# Patient Record
Sex: FEMALE | Race: WHITE | NOT HISPANIC OR LATINO | Employment: OTHER | URBAN - METROPOLITAN AREA
[De-identification: names, ages, dates, MRNs, and addresses within clinical notes are randomized per-mention and may not be internally consistent; named-entity substitution may affect disease eponyms.]

---

## 2022-12-16 ENCOUNTER — HOSPITAL ENCOUNTER (OUTPATIENT)
Dept: GENERAL RADIOLOGY | Facility: HOSPITAL | Age: 87
Discharge: HOME | End: 2022-12-16

## 2022-12-16 ENCOUNTER — PRE-ADMISSION TESTING (OUTPATIENT)
Dept: PREADMISSION TESTING | Facility: HOSPITAL | Age: 87
End: 2022-12-16

## 2022-12-16 VITALS
RESPIRATION RATE: 18 BRPM | WEIGHT: 119 LBS | BODY MASS INDEX: 21.09 KG/M2 | DIASTOLIC BLOOD PRESSURE: 73 MMHG | HEIGHT: 63 IN | SYSTOLIC BLOOD PRESSURE: 147 MMHG | HEART RATE: 82 BPM | TEMPERATURE: 98 F | OXYGEN SATURATION: 98 %

## 2022-12-16 LAB
ALBUMIN SERPL-MCNC: 4.2 G/DL (ref 3.5–5.2)
ALBUMIN/GLOB SERPL: 2.6 G/DL
ALP SERPL-CCNC: 58 U/L (ref 39–117)
ALT SERPL W P-5'-P-CCNC: 11 U/L (ref 1–33)
ANION GAP SERPL CALCULATED.3IONS-SCNC: 5.4 MMOL/L (ref 5–15)
AST SERPL-CCNC: 14 U/L (ref 1–32)
BILIRUB SERPL-MCNC: 0.3 MG/DL (ref 0–1.2)
BUN SERPL-MCNC: 24 MG/DL (ref 8–23)
BUN/CREAT SERPL: 37.5 (ref 7–25)
CALCIUM SPEC-SCNC: 8.9 MG/DL (ref 8.2–9.6)
CHLORIDE SERPL-SCNC: 105 MMOL/L (ref 98–107)
CO2 SERPL-SCNC: 26.6 MMOL/L (ref 22–29)
CREAT SERPL-MCNC: 0.64 MG/DL (ref 0.57–1)
DEPRECATED RDW RBC AUTO: 39.9 FL (ref 37–54)
EGFRCR SERPLBLD CKD-EPI 2021: 84.1 ML/MIN/1.73
ERYTHROCYTE [DISTWIDTH] IN BLOOD BY AUTOMATED COUNT: 12 % (ref 12.3–15.4)
GLOBULIN UR ELPH-MCNC: 1.6 GM/DL
GLUCOSE SERPL-MCNC: 94 MG/DL (ref 65–99)
HBA1C MFR BLD: 5.5 % (ref 4.8–5.6)
HCT VFR BLD AUTO: 39.9 % (ref 34–46.6)
HGB BLD-MCNC: 13.2 G/DL (ref 12–15.9)
MCH RBC QN AUTO: 29.9 PG (ref 26.6–33)
MCHC RBC AUTO-ENTMCNC: 33.1 G/DL (ref 31.5–35.7)
MCV RBC AUTO: 90.5 FL (ref 79–97)
PLATELET # BLD AUTO: 236 10*3/MM3 (ref 140–450)
PMV BLD AUTO: 10.3 FL (ref 6–12)
POTASSIUM SERPL-SCNC: 4 MMOL/L (ref 3.5–5.2)
PROT SERPL-MCNC: 5.8 G/DL (ref 6–8.5)
QT INTERVAL: 424 MS
RBC # BLD AUTO: 4.41 10*6/MM3 (ref 3.77–5.28)
SODIUM SERPL-SCNC: 137 MMOL/L (ref 136–145)
WBC NRBC COR # BLD: 7.55 10*3/MM3 (ref 3.4–10.8)

## 2022-12-16 PROCEDURE — 73560 X-RAY EXAM OF KNEE 1 OR 2: CPT

## 2022-12-16 PROCEDURE — 36415 COLL VENOUS BLD VENIPUNCTURE: CPT

## 2022-12-16 PROCEDURE — 93005 ELECTROCARDIOGRAM TRACING: CPT

## 2022-12-16 PROCEDURE — 71046 X-RAY EXAM CHEST 2 VIEWS: CPT

## 2022-12-16 PROCEDURE — 85027 COMPLETE CBC AUTOMATED: CPT

## 2022-12-16 PROCEDURE — 83036 HEMOGLOBIN GLYCOSYLATED A1C: CPT

## 2022-12-16 PROCEDURE — 80053 COMPREHEN METABOLIC PANEL: CPT

## 2022-12-16 PROCEDURE — 93010 ELECTROCARDIOGRAM REPORT: CPT | Performed by: INTERNAL MEDICINE

## 2022-12-16 NOTE — DISCHARGE INSTRUCTIONS

## 2022-12-29 ENCOUNTER — APPOINTMENT (OUTPATIENT)
Dept: GENERAL RADIOLOGY | Facility: HOSPITAL | Age: 87
End: 2022-12-29
Payer: COMMERCIAL

## 2022-12-29 ENCOUNTER — ANESTHESIA EVENT (OUTPATIENT)
Dept: PERIOP | Facility: HOSPITAL | Age: 87
End: 2022-12-29
Payer: COMMERCIAL

## 2022-12-29 ENCOUNTER — ANESTHESIA (OUTPATIENT)
Dept: PERIOP | Facility: HOSPITAL | Age: 87
End: 2022-12-29
Payer: COMMERCIAL

## 2022-12-29 ENCOUNTER — HOSPITAL ENCOUNTER (OUTPATIENT)
Facility: HOSPITAL | Age: 87
Discharge: HOME | End: 2022-12-30
Attending: ORTHOPAEDIC SURGERY | Admitting: ORTHOPAEDIC SURGERY
Payer: COMMERCIAL

## 2022-12-29 DIAGNOSIS — M17.12 PRIMARY OSTEOARTHRITIS OF LEFT KNEE: Primary | ICD-10-CM

## 2022-12-29 PROCEDURE — C1713 ANCHOR/SCREW BN/BN,TIS/BN: HCPCS | Performed by: ORTHOPAEDIC SURGERY

## 2022-12-29 PROCEDURE — G0378 HOSPITAL OBSERVATION PER HR: HCPCS

## 2022-12-29 PROCEDURE — 25010000002 ROPIVACAINE PER 1 MG: Performed by: ORTHOPAEDIC SURGERY

## 2022-12-29 PROCEDURE — 73560 X-RAY EXAM OF KNEE 1 OR 2: CPT

## 2022-12-29 PROCEDURE — 25010000002 CEFAZOLIN PER 500 MG: Performed by: NURSE ANESTHETIST, CERTIFIED REGISTERED

## 2022-12-29 PROCEDURE — 25010000002 DEXAMETHASONE SODIUM PHOSPHATE 20 MG/5ML SOLUTION: Performed by: ANESTHESIOLOGY

## 2022-12-29 PROCEDURE — 97162 PT EVAL MOD COMPLEX 30 MIN: CPT

## 2022-12-29 PROCEDURE — 25010000002 ONDANSETRON PER 1 MG: Performed by: NURSE ANESTHETIST, CERTIFIED REGISTERED

## 2022-12-29 PROCEDURE — 25010000002 KETOROLAC TROMETHAMINE PER 15 MG: Performed by: ORTHOPAEDIC SURGERY

## 2022-12-29 PROCEDURE — 25010000002 MORPHINE PER 10 MG: Performed by: ORTHOPAEDIC SURGERY

## 2022-12-29 PROCEDURE — 25010000002 NEOSTIGMINE 5 MG/10ML SOLUTION: Performed by: NURSE ANESTHETIST, CERTIFIED REGISTERED

## 2022-12-29 PROCEDURE — 97110 THERAPEUTIC EXERCISES: CPT

## 2022-12-29 PROCEDURE — 25010000002 PROPOFOL 10 MG/ML EMULSION: Performed by: NURSE ANESTHETIST, CERTIFIED REGISTERED

## 2022-12-29 PROCEDURE — 25010000002 EPINEPHRINE 1 MG/ML SOLUTION 30 ML VIAL: Performed by: ORTHOPAEDIC SURGERY

## 2022-12-29 PROCEDURE — C1776 JOINT DEVICE (IMPLANTABLE): HCPCS | Performed by: ORTHOPAEDIC SURGERY

## 2022-12-29 PROCEDURE — 25010000002 CEFAZOLIN IN DEXTROSE 2-4 GM/100ML-% SOLUTION: Performed by: ORTHOPAEDIC SURGERY

## 2022-12-29 PROCEDURE — 76942 ECHO GUIDE FOR BIOPSY: CPT | Performed by: ORTHOPAEDIC SURGERY

## 2022-12-29 PROCEDURE — 25010000002 ROPIVACAINE PER 1 MG: Performed by: ANESTHESIOLOGY

## 2022-12-29 DEVICE — IMPLANTABLE DEVICE
Type: IMPLANTABLE DEVICE | Site: KNEE | Status: FUNCTIONAL
Brand: VANGUARD® KNEE SYSTEM

## 2022-12-29 DEVICE — CAP TOTL KN CMT PRIMARY: Type: IMPLANTABLE DEVICE | Status: FUNCTIONAL

## 2022-12-29 DEVICE — IMPLANTABLE DEVICE
Type: IMPLANTABLE DEVICE | Site: KNEE | Status: FUNCTIONAL
Brand: BIOMET® KNEE SYSTEM

## 2022-12-29 DEVICE — IMPLANTABLE DEVICE
Type: IMPLANTABLE DEVICE | Site: KNEE | Status: FUNCTIONAL
Brand: BIOMET KNEE SYSTEM

## 2022-12-29 DEVICE — CMT BONE R 1X40: Type: IMPLANTABLE DEVICE | Site: KNEE | Status: FUNCTIONAL

## 2022-12-29 RX ORDER — GLYCOPYRROLATE 0.2 MG/ML
INJECTION INTRAMUSCULAR; INTRAVENOUS AS NEEDED
Status: DISCONTINUED | OUTPATIENT
Start: 2022-12-29 | End: 2022-12-29 | Stop reason: SURG

## 2022-12-29 RX ORDER — NALOXONE HCL 0.4 MG/ML
0.2 VIAL (ML) INJECTION AS NEEDED
Status: DISCONTINUED | OUTPATIENT
Start: 2022-12-29 | End: 2022-12-29 | Stop reason: HOSPADM

## 2022-12-29 RX ORDER — DOCUSATE SODIUM 250 MG
250 CAPSULE ORAL 2 TIMES DAILY PRN
Qty: 30 CAPSULE | Refills: 1 | Status: SHIPPED | OUTPATIENT
Start: 2022-12-29

## 2022-12-29 RX ORDER — DOCUSATE SODIUM 100 MG/1
100 CAPSULE, LIQUID FILLED ORAL 2 TIMES DAILY PRN
Status: DISCONTINUED | OUTPATIENT
Start: 2022-12-29 | End: 2022-12-30 | Stop reason: HOSPADM

## 2022-12-29 RX ORDER — KETOROLAC TROMETHAMINE 15 MG/ML
15 INJECTION, SOLUTION INTRAMUSCULAR; INTRAVENOUS EVERY 6 HOURS PRN
Status: DISCONTINUED | OUTPATIENT
Start: 2022-12-29 | End: 2022-12-30 | Stop reason: HOSPADM

## 2022-12-29 RX ORDER — DIAZEPAM 5 MG/1
5 TABLET ORAL 2 TIMES DAILY PRN
Status: DISCONTINUED | OUTPATIENT
Start: 2022-12-29 | End: 2022-12-30 | Stop reason: HOSPADM

## 2022-12-29 RX ORDER — SODIUM CHLORIDE, SODIUM LACTATE, POTASSIUM CHLORIDE, CALCIUM CHLORIDE 600; 310; 30; 20 MG/100ML; MG/100ML; MG/100ML; MG/100ML
9 INJECTION, SOLUTION INTRAVENOUS CONTINUOUS
Status: DISCONTINUED | OUTPATIENT
Start: 2022-12-29 | End: 2022-12-30 | Stop reason: HOSPADM

## 2022-12-29 RX ORDER — DIPHENHYDRAMINE HCL 25 MG
25 CAPSULE ORAL
Status: DISCONTINUED | OUTPATIENT
Start: 2022-12-29 | End: 2022-12-29 | Stop reason: HOSPADM

## 2022-12-29 RX ORDER — ASPIRIN 325 MG
325 TABLET, DELAYED RELEASE (ENTERIC COATED) ORAL DAILY
Status: DISCONTINUED | OUTPATIENT
Start: 2022-12-30 | End: 2022-12-30 | Stop reason: HOSPADM

## 2022-12-29 RX ORDER — MORPHINE SULFATE 4 MG/ML
4 INJECTION, SOLUTION INTRAMUSCULAR; INTRAVENOUS
Status: DISCONTINUED | OUTPATIENT
Start: 2022-12-29 | End: 2022-12-30 | Stop reason: HOSPADM

## 2022-12-29 RX ORDER — SODIUM CHLORIDE 9 MG/ML
40 INJECTION, SOLUTION INTRAVENOUS AS NEEDED
Status: DISCONTINUED | OUTPATIENT
Start: 2022-12-29 | End: 2022-12-30 | Stop reason: HOSPADM

## 2022-12-29 RX ORDER — NEOSTIGMINE METHYLSULFATE 0.5 MG/ML
INJECTION, SOLUTION INTRAVENOUS AS NEEDED
Status: DISCONTINUED | OUTPATIENT
Start: 2022-12-29 | End: 2022-12-29 | Stop reason: SURG

## 2022-12-29 RX ORDER — IPRATROPIUM BROMIDE AND ALBUTEROL SULFATE 2.5; .5 MG/3ML; MG/3ML
3 SOLUTION RESPIRATORY (INHALATION) ONCE AS NEEDED
Status: DISCONTINUED | OUTPATIENT
Start: 2022-12-29 | End: 2022-12-29 | Stop reason: HOSPADM

## 2022-12-29 RX ORDER — DIPHENHYDRAMINE HYDROCHLORIDE 50 MG/ML
12.5 INJECTION INTRAMUSCULAR; INTRAVENOUS EVERY 6 HOURS PRN
Status: DISCONTINUED | OUTPATIENT
Start: 2022-12-29 | End: 2022-12-30 | Stop reason: HOSPADM

## 2022-12-29 RX ORDER — PROMETHAZINE HYDROCHLORIDE 25 MG/1
25 TABLET ORAL ONCE AS NEEDED
Status: DISCONTINUED | OUTPATIENT
Start: 2022-12-29 | End: 2022-12-29 | Stop reason: HOSPADM

## 2022-12-29 RX ORDER — ONDANSETRON 2 MG/ML
4 INJECTION INTRAMUSCULAR; INTRAVENOUS ONCE AS NEEDED
Status: DISCONTINUED | OUTPATIENT
Start: 2022-12-29 | End: 2022-12-29 | Stop reason: HOSPADM

## 2022-12-29 RX ORDER — ONDANSETRON 4 MG/1
4 TABLET, FILM COATED ORAL EVERY 6 HOURS PRN
Status: DISCONTINUED | OUTPATIENT
Start: 2022-12-29 | End: 2022-12-30 | Stop reason: HOSPADM

## 2022-12-29 RX ORDER — FENTANYL CITRATE 50 UG/ML
25 INJECTION, SOLUTION INTRAMUSCULAR; INTRAVENOUS
Status: DISCONTINUED | OUTPATIENT
Start: 2022-12-29 | End: 2022-12-29 | Stop reason: HOSPADM

## 2022-12-29 RX ORDER — CEFAZOLIN SODIUM 500 MG/2.2ML
INJECTION, POWDER, FOR SOLUTION INTRAMUSCULAR; INTRAVENOUS AS NEEDED
Status: DISCONTINUED | OUTPATIENT
Start: 2022-12-29 | End: 2022-12-29 | Stop reason: SURG

## 2022-12-29 RX ORDER — PROMETHAZINE HYDROCHLORIDE 12.5 MG/1
12.5 TABLET ORAL EVERY 6 HOURS PRN
Status: DISCONTINUED | OUTPATIENT
Start: 2022-12-29 | End: 2022-12-30 | Stop reason: HOSPADM

## 2022-12-29 RX ORDER — SODIUM CHLORIDE 0.9 % (FLUSH) 0.9 %
10 SYRINGE (ML) INJECTION EVERY 12 HOURS SCHEDULED
Status: DISCONTINUED | OUTPATIENT
Start: 2022-12-29 | End: 2022-12-30 | Stop reason: HOSPADM

## 2022-12-29 RX ORDER — CHOLECALCIFEROL (VITAMIN D3) 125 MCG
5 CAPSULE ORAL NIGHTLY PRN
Status: DISCONTINUED | OUTPATIENT
Start: 2022-12-29 | End: 2022-12-30 | Stop reason: HOSPADM

## 2022-12-29 RX ORDER — ROCURONIUM BROMIDE 10 MG/ML
INJECTION, SOLUTION INTRAVENOUS AS NEEDED
Status: DISCONTINUED | OUTPATIENT
Start: 2022-12-29 | End: 2022-12-29 | Stop reason: SURG

## 2022-12-29 RX ORDER — CLINDAMYCIN PHOSPHATE 900 MG/50ML
900 INJECTION INTRAVENOUS EVERY 8 HOURS
Status: COMPLETED | OUTPATIENT
Start: 2022-12-29 | End: 2022-12-30

## 2022-12-29 RX ORDER — FAMOTIDINE 10 MG/ML
20 INJECTION, SOLUTION INTRAVENOUS ONCE
Status: COMPLETED | OUTPATIENT
Start: 2022-12-29 | End: 2022-12-29

## 2022-12-29 RX ORDER — PHENYLEPHRINE HCL IN 0.9% NACL 1 MG/10 ML
SYRINGE (ML) INTRAVENOUS AS NEEDED
Status: DISCONTINUED | OUTPATIENT
Start: 2022-12-29 | End: 2022-12-29 | Stop reason: SURG

## 2022-12-29 RX ORDER — SODIUM CHLORIDE 0.9 % (FLUSH) 0.9 %
1-10 SYRINGE (ML) INJECTION AS NEEDED
Status: DISCONTINUED | OUTPATIENT
Start: 2022-12-29 | End: 2022-12-30 | Stop reason: HOSPADM

## 2022-12-29 RX ORDER — OXYCODONE AND ACETAMINOPHEN 7.5; 325 MG/1; MG/1
1 TABLET ORAL EVERY 4 HOURS PRN
Status: DISCONTINUED | OUTPATIENT
Start: 2022-12-29 | End: 2022-12-29 | Stop reason: HOSPADM

## 2022-12-29 RX ORDER — MAGNESIUM HYDROXIDE 1200 MG/15ML
LIQUID ORAL AS NEEDED
Status: DISCONTINUED | OUTPATIENT
Start: 2022-12-29 | End: 2022-12-29 | Stop reason: HOSPADM

## 2022-12-29 RX ORDER — TRANEXAMIC ACID 100 MG/ML
INJECTION, SOLUTION INTRAVENOUS AS NEEDED
Status: DISCONTINUED | OUTPATIENT
Start: 2022-12-29 | End: 2022-12-29 | Stop reason: SURG

## 2022-12-29 RX ORDER — PROPOFOL 10 MG/ML
VIAL (ML) INTRAVENOUS AS NEEDED
Status: DISCONTINUED | OUTPATIENT
Start: 2022-12-29 | End: 2022-12-29 | Stop reason: SURG

## 2022-12-29 RX ORDER — OXYCODONE HYDROCHLORIDE AND ACETAMINOPHEN 5; 325 MG/1; MG/1
1 TABLET ORAL EVERY 4 HOURS PRN
Status: DISCONTINUED | OUTPATIENT
Start: 2022-12-29 | End: 2022-12-30 | Stop reason: HOSPADM

## 2022-12-29 RX ORDER — DEXAMETHASONE SODIUM PHOSPHATE 4 MG/ML
INJECTION, SOLUTION INTRA-ARTICULAR; INTRALESIONAL; INTRAMUSCULAR; INTRAVENOUS; SOFT TISSUE
Status: COMPLETED | OUTPATIENT
Start: 2022-12-29 | End: 2022-12-29

## 2022-12-29 RX ORDER — ACETAMINOPHEN 500 MG
1000 TABLET ORAL ONCE
Status: COMPLETED | OUTPATIENT
Start: 2022-12-29 | End: 2022-12-29

## 2022-12-29 RX ORDER — ONDANSETRON 2 MG/ML
4 INJECTION INTRAMUSCULAR; INTRAVENOUS EVERY 6 HOURS PRN
Status: DISCONTINUED | OUTPATIENT
Start: 2022-12-29 | End: 2022-12-30 | Stop reason: HOSPADM

## 2022-12-29 RX ORDER — SODIUM CHLORIDE 0.9 % (FLUSH) 0.9 %
3-10 SYRINGE (ML) INJECTION AS NEEDED
Status: DISCONTINUED | OUTPATIENT
Start: 2022-12-29 | End: 2022-12-29 | Stop reason: HOSPADM

## 2022-12-29 RX ORDER — ONDANSETRON 2 MG/ML
INJECTION INTRAMUSCULAR; INTRAVENOUS AS NEEDED
Status: DISCONTINUED | OUTPATIENT
Start: 2022-12-29 | End: 2022-12-29 | Stop reason: SURG

## 2022-12-29 RX ORDER — LABETALOL HYDROCHLORIDE 5 MG/ML
5 INJECTION, SOLUTION INTRAVENOUS
Status: DISCONTINUED | OUTPATIENT
Start: 2022-12-29 | End: 2022-12-29 | Stop reason: HOSPADM

## 2022-12-29 RX ORDER — LIDOCAINE HYDROCHLORIDE 10 MG/ML
0.5 INJECTION, SOLUTION EPIDURAL; INFILTRATION; INTRACAUDAL; PERINEURAL ONCE AS NEEDED
Status: COMPLETED | OUTPATIENT
Start: 2022-12-29 | End: 2022-12-29

## 2022-12-29 RX ORDER — DIPHENHYDRAMINE HYDROCHLORIDE 50 MG/ML
12.5 INJECTION INTRAMUSCULAR; INTRAVENOUS
Status: DISCONTINUED | OUTPATIENT
Start: 2022-12-29 | End: 2022-12-29 | Stop reason: HOSPADM

## 2022-12-29 RX ORDER — SODIUM CHLORIDE 0.9 % (FLUSH) 0.9 %
3 SYRINGE (ML) INJECTION EVERY 12 HOURS SCHEDULED
Status: DISCONTINUED | OUTPATIENT
Start: 2022-12-29 | End: 2022-12-29 | Stop reason: HOSPADM

## 2022-12-29 RX ORDER — FLUMAZENIL 0.1 MG/ML
0.2 INJECTION INTRAVENOUS AS NEEDED
Status: DISCONTINUED | OUTPATIENT
Start: 2022-12-29 | End: 2022-12-29 | Stop reason: HOSPADM

## 2022-12-29 RX ORDER — DIPHENHYDRAMINE HCL 25 MG
25 CAPSULE ORAL EVERY 6 HOURS PRN
Status: DISCONTINUED | OUTPATIENT
Start: 2022-12-29 | End: 2022-12-30 | Stop reason: HOSPADM

## 2022-12-29 RX ORDER — ACETAMINOPHEN 325 MG/1
325 TABLET ORAL EVERY 4 HOURS PRN
Status: DISCONTINUED | OUTPATIENT
Start: 2022-12-29 | End: 2022-12-30 | Stop reason: HOSPADM

## 2022-12-29 RX ORDER — PROMETHAZINE HYDROCHLORIDE 25 MG/1
25 SUPPOSITORY RECTAL ONCE AS NEEDED
Status: DISCONTINUED | OUTPATIENT
Start: 2022-12-29 | End: 2022-12-29 | Stop reason: HOSPADM

## 2022-12-29 RX ORDER — ROPIVACAINE HYDROCHLORIDE 5 MG/ML
INJECTION, SOLUTION EPIDURAL; INFILTRATION; PERINEURAL
Status: COMPLETED | OUTPATIENT
Start: 2022-12-29 | End: 2022-12-29

## 2022-12-29 RX ORDER — SODIUM CHLORIDE 450 MG/100ML
100 INJECTION, SOLUTION INTRAVENOUS CONTINUOUS
Status: DISCONTINUED | OUTPATIENT
Start: 2022-12-29 | End: 2022-12-30 | Stop reason: HOSPADM

## 2022-12-29 RX ORDER — NALOXONE HCL 0.4 MG/ML
0.4 VIAL (ML) INJECTION
Status: DISCONTINUED | OUTPATIENT
Start: 2022-12-29 | End: 2022-12-30 | Stop reason: HOSPADM

## 2022-12-29 RX ORDER — OXYCODONE HYDROCHLORIDE AND ACETAMINOPHEN 5; 325 MG/1; MG/1
2 TABLET ORAL EVERY 4 HOURS PRN
Status: DISCONTINUED | OUTPATIENT
Start: 2022-12-29 | End: 2022-12-30 | Stop reason: HOSPADM

## 2022-12-29 RX ORDER — CELECOXIB 200 MG/1
200 CAPSULE ORAL ONCE
Status: COMPLETED | OUTPATIENT
Start: 2022-12-29 | End: 2022-12-29

## 2022-12-29 RX ORDER — OXYCODONE HYDROCHLORIDE AND ACETAMINOPHEN 5; 325 MG/1; MG/1
1-2 TABLET ORAL EVERY 4 HOURS PRN
Qty: 42 TABLET | Refills: 0 | Status: SHIPPED | OUTPATIENT
Start: 2022-12-29

## 2022-12-29 RX ORDER — ASPIRIN 325 MG
325 TABLET, DELAYED RELEASE (ENTERIC COATED) ORAL DAILY
Qty: 42 TABLET | Refills: 0 | Status: SHIPPED | OUTPATIENT
Start: 2022-12-30

## 2022-12-29 RX ORDER — HYDRALAZINE HYDROCHLORIDE 20 MG/ML
5 INJECTION INTRAMUSCULAR; INTRAVENOUS
Status: DISCONTINUED | OUTPATIENT
Start: 2022-12-29 | End: 2022-12-29 | Stop reason: HOSPADM

## 2022-12-29 RX ORDER — HYDROCODONE BITARTRATE AND ACETAMINOPHEN 7.5; 325 MG/1; MG/1
1 TABLET ORAL ONCE AS NEEDED
Status: COMPLETED | OUTPATIENT
Start: 2022-12-29 | End: 2022-12-29

## 2022-12-29 RX ORDER — SODIUM CHLORIDE, SODIUM LACTATE, POTASSIUM CHLORIDE, CALCIUM CHLORIDE 600; 310; 30; 20 MG/100ML; MG/100ML; MG/100ML; MG/100ML
100 INJECTION, SOLUTION INTRAVENOUS CONTINUOUS
Status: DISCONTINUED | OUTPATIENT
Start: 2022-12-29 | End: 2022-12-30 | Stop reason: HOSPADM

## 2022-12-29 RX ORDER — EPHEDRINE SULFATE 50 MG/ML
5 INJECTION, SOLUTION INTRAVENOUS ONCE AS NEEDED
Status: DISCONTINUED | OUTPATIENT
Start: 2022-12-29 | End: 2022-12-29 | Stop reason: HOSPADM

## 2022-12-29 RX ORDER — LIDOCAINE HYDROCHLORIDE 20 MG/ML
INJECTION, SOLUTION INTRAVENOUS AS NEEDED
Status: DISCONTINUED | OUTPATIENT
Start: 2022-12-29 | End: 2022-12-29 | Stop reason: SURG

## 2022-12-29 RX ORDER — FERROUS SULFATE 325(65) MG
325 TABLET ORAL
Status: DISCONTINUED | OUTPATIENT
Start: 2022-12-29 | End: 2022-12-30 | Stop reason: HOSPADM

## 2022-12-29 RX ORDER — CEFAZOLIN SODIUM 2 G/100ML
2 INJECTION, SOLUTION INTRAVENOUS ONCE
Status: COMPLETED | OUTPATIENT
Start: 2022-12-29 | End: 2022-12-29

## 2022-12-29 RX ADMIN — DEXAMETHASONE SODIUM PHOSPHATE 4 MG: 4 INJECTION, SOLUTION INTRAMUSCULAR; INTRAVENOUS at 06:42

## 2022-12-29 RX ADMIN — SODIUM CHLORIDE, POTASSIUM CHLORIDE, SODIUM LACTATE AND CALCIUM CHLORIDE 9 ML/HR: 600; 310; 30; 20 INJECTION, SOLUTION INTRAVENOUS at 06:05

## 2022-12-29 RX ADMIN — Medication 10 ML: at 22:09

## 2022-12-29 RX ADMIN — Medication 100 MCG: at 08:46

## 2022-12-29 RX ADMIN — NEOSTIGMINE METHYLSULFATE 2 MG: 0.5 INJECTION INTRAVENOUS at 08:58

## 2022-12-29 RX ADMIN — TRANEXAMIC ACID 1000 MG: 1 INJECTION, SOLUTION INTRAVENOUS at 07:30

## 2022-12-29 RX ADMIN — DEXAMETHASONE SODIUM PHOSPHATE 6 MG: 4 INJECTION, SOLUTION INTRAMUSCULAR; INTRAVENOUS at 07:22

## 2022-12-29 RX ADMIN — CLINDAMYCIN PHOSPHATE 900 MG: 900 INJECTION, SOLUTION INTRAVENOUS at 16:55

## 2022-12-29 RX ADMIN — CEFAZOLIN 2 G: 1 INJECTION, POWDER, FOR SOLUTION INTRAMUSCULAR; INTRAVENOUS at 08:42

## 2022-12-29 RX ADMIN — Medication 100 MCG: at 07:41

## 2022-12-29 RX ADMIN — SODIUM CHLORIDE 100 ML/HR: 4.5 INJECTION, SOLUTION INTRAVENOUS at 13:59

## 2022-12-29 RX ADMIN — LIDOCAINE HYDROCHLORIDE 60 MG: 20 INJECTION, SOLUTION INTRAVENOUS at 07:22

## 2022-12-29 RX ADMIN — ROCURONIUM BROMIDE 35 MG: 10 INJECTION, SOLUTION INTRAVENOUS at 07:22

## 2022-12-29 RX ADMIN — HYDROCODONE BITARTRATE AND ACETAMINOPHEN 1 TABLET: 7.5; 325 TABLET ORAL at 09:53

## 2022-12-29 RX ADMIN — FERROUS SULFATE TAB 325 MG (65 MG ELEMENTAL FE) 325 MG: 325 (65 FE) TAB at 13:59

## 2022-12-29 RX ADMIN — PROPOFOL 100 MG: 10 INJECTION, EMULSION INTRAVENOUS at 07:22

## 2022-12-29 RX ADMIN — LIDOCAINE HYDROCHLORIDE 0.5 ML: 10 INJECTION, SOLUTION EPIDURAL; INFILTRATION; INTRACAUDAL; PERINEURAL at 06:00

## 2022-12-29 RX ADMIN — ONDANSETRON 4 MG: 2 INJECTION INTRAMUSCULAR; INTRAVENOUS at 08:58

## 2022-12-29 RX ADMIN — FAMOTIDINE 20 MG: 10 INJECTION INTRAVENOUS at 06:08

## 2022-12-29 RX ADMIN — PROPOFOL 100 MCG/KG/MIN: 10 INJECTION, EMULSION INTRAVENOUS at 07:27

## 2022-12-29 RX ADMIN — OXYCODONE AND ACETAMINOPHEN 1 TABLET: 5; 325 TABLET ORAL at 22:09

## 2022-12-29 RX ADMIN — CELECOXIB 200 MG: 200 CAPSULE ORAL at 05:49

## 2022-12-29 RX ADMIN — ROPIVACAINE HYDROCHLORIDE 20 ML: 5 INJECTION, SOLUTION EPIDURAL; INFILTRATION; PERINEURAL at 06:42

## 2022-12-29 RX ADMIN — ACETAMINOPHEN 1000 MG: 500 TABLET, FILM COATED ORAL at 05:49

## 2022-12-29 RX ADMIN — Medication 5 MG: at 22:09

## 2022-12-29 RX ADMIN — CEFAZOLIN SODIUM 2 G: 2 INJECTION, SOLUTION INTRAVENOUS at 06:48

## 2022-12-29 RX ADMIN — SODIUM CHLORIDE, POTASSIUM CHLORIDE, SODIUM LACTATE AND CALCIUM CHLORIDE 100 ML/HR: 600; 310; 30; 20 INJECTION, SOLUTION INTRAVENOUS at 10:48

## 2022-12-29 RX ADMIN — GLYCOPYRROLATE 0.4 MCG: 1 INJECTION INTRAMUSCULAR; INTRAVENOUS at 08:58

## 2022-12-29 RX ADMIN — Medication 10 ML: at 13:59

## 2022-12-29 NOTE — ANESTHESIA PROCEDURE NOTES
Adductor Canal Block      Patient reassessed immediately prior to procedure    Patient location during procedure: pre-op  Start time: 12/29/2022 6:42 AM  Stop time: 12/29/2022 6:44 AM  Reason for block: at surgeon's request and post-op pain management  Performed by  Anesthesiologist: Apurva Davidson MD  Preanesthetic Checklist  Completed: patient identified, IV checked, site marked, risks and benefits discussed, surgical consent, monitors and equipment checked, pre-op evaluation and timeout performed  Prep:  Pt Position: sitting  Sterile barriers:cap, gloves and mask  Prep: ChloraPrep  Patient monitoring: blood pressure monitoring, continuous pulse oximetry and EKG  Procedure    Sedation: no  Performed under: local infiltration  Guidance:ultrasound guided    ULTRASOUND INTERPRETATION.  Using ultrasound guidance a 21 G gauge needle was placed in close proximity to the nerve, at which point, under ultrasound guidance anesthetic was injected in the area of the nerve and spread of the anesthesia was seen on ultrasound in close proximity thereto.  There were no abnormalities seen on ultrasound; a digital image was taken; and the patient tolerated the procedure with no complications. Images:still images obtained, printed/placed on chart    Laterality:left  Block Type:adductor canal block  Injection Technique:single-shot  Needle Type:short-bevel and echogenic  Needle Gauge:21 G      Medications Used: dexamethasone (DECADRON) injection - Injection   4 mg - 12/29/2022 6:42:00 AM  ropivacaine (NAROPIN) 0.5 % injection - Injection   20 mL - 12/29/2022 6:42:00 AM      Medications  Comment:Ultrasound Interpretation:  Using ultrasound guidance the needle was placed in close proximity to the target nerve and anesthetic was injected in the area of the target nerve and/or bundles, and spread of the anesthetic was seen on ultrasound in close proximity thereto.  There were no abnormalities seen on ultrasound; a digital image was  taken; and the patient tolerated the procedure with no complications.    Block placed for postoperative pain control per surgeon request.       Post Assessment  Injection Assessment: negative aspiration for heme, no paresthesia on injection and incremental injection  Patient Tolerance:comfortable throughout block  Complications:no

## 2022-12-29 NOTE — DISCHARGE SUMMARY
Orthopaedic Surgery Discharge Summary    Patient Name:  Aydee May  YOB: 1932  Age: 89 y.o.  Medical Records Number:  [id number]    Date of Admission:  12/29/2022  Date of Discharge:  12/30/22    Primary Discharge Diagnosis:  Primary osteoarthritis of left knee [M17.12]    Secondary Discharge Diagnosis:    Problems Addressed this Visit        Musculoskeletal and Injuries    * (Principal) Primary osteoarthritis of left knee - Primary    Relevant Medications    oxyCODONE-acetaminophen (PERCOCET) 5-325 MG per tablet   Diagnoses       Codes Comments    Primary osteoarthritis of left knee    -  Primary ICD-10-CM: M17.12  ICD-9-CM: 715.16           Procedures Performed:  Left Total Knee Arthroplasty      Hospital Course:    Aydee May is a 89 y.o.  female who underwent successful left tka on 12/29/2022.  Aydee May was started on Aspirin 325 mg po daily post-operatively for DVT prophylaxis.  On post-op day 1 the patients dressing was changed and their incision was clean, with no signs of infection and their calf was soft, with no signs of DVT.  The patient progressed well with physical therapy and the patients hemoglobin remained stable. On post-operative day 1 the patient was felt ready for discharge.     Vitals:  Vitals:    12/29/22 1200 12/29/22 1215 12/29/22 1230 12/29/22 1300   BP: 136/70 136/65 136/69 142/69   BP Location:    Left arm   Patient Position:    Lying   Pulse: 95 88 96 98   Resp: 18 18 18 18   Temp: 97.8 °F (36.6 °C)  98.1 °F (36.7 °C) 97.3 °F (36.3 °C)   TempSrc:   Oral Oral   SpO2: 95% 93% 94% 90%       Discharge Medications:      Discharge Medications      New Medications      Instructions Start Date   aspirin  MG tablet   325 mg, Oral, Daily   Start Date: December 30, 2022     docusate sodium 250 MG capsule  Commonly known as: COLACE   250 mg, Oral, 2 Times Daily PRN      oxyCODONE-acetaminophen 5-325 MG per tablet  Commonly known as: PERCOCET   1-2 tablets, Oral,  Every 4 Hours PRN         Continue These Medications      Instructions Start Date   BIOTIN PO   Oral, HOLD PRIOR TO SURG      CALCIUM PO   Oral, HOLD PRIOR TO SURG      FIBER PO   Oral, HOLD PRIOR TO SURG      GLUCOSAMINE HCL-GLUCOSAMIN SO4 PO   Oral, HOLD PRIOR TO SURG         Stop These Medications    CHLORHEXIDINE GLUCONATE CLOTH EX     naproxen sodium 220 MG tablet  Commonly known as: ALEVE            Pain Medications:  Percocet 5/325 mg 1-2 po q 4-6 hours prn pain    DVT Prophylaxis:  Enteric Coated Aspirin 325 mg po daily for 6 weeks      Total Knee Joint Replacement Discharge Instructions:    I. ACTIVITIES:  1. Exercises:  ? Complete exercise program as taught by the hospital physical therapist 2 times per day  ? Exercise program will be advanced by the physical therapist  ? During the day be up ambulating every 2 hours (while awake) for short distances  ? Complete the ankle pump exercises at least 10 times per hour (while awake)  ? Elevate legs most of the day the first week post operatively and thereafter elevate legs when in bed and for at least 30 minutes during the day. Caution must be taken to avoid pillow placement under the bend of the knee as this can led to flexion contractures of the knee.  ? Use cold packs 20-30 minutes approximately 5 times per day. This should be done before and after completing your exercises and at any time you are experiencing pain/ stiffness in your operative extremity.      2. Activities of Daily Living:  ? No tub baths, hot tubs, or swimming pools for 4 weeks  ? May shower and let water run over the incision on post-operative day #5 if no drainage. Do not scrub or rub the incision. Simply let the water run over the incision and pat dry.    II. Restrictions  ? Do not cross legs or kneel  ? Your surgeon will discuss with you when you will be able to drive again.  ? Weight bearing is as tolerated  ? First week stay inside on even terrain. May go up and down stairs one stair  at a time utilizing the hand rail  ? After one week, you may venture outside.    III. Precautions:  ? Everyone that comes near you should wash their hands  ? No elective dental, genital-urinary, or colon procedures or surgical procedures for 12 weeks after surgery unless absolutely necessary.  ?  If dental work or surgical procedure is deemed absolutely necessary, you will need to contact your surgeon as you will need to take antibiotics 1 hour prior to any dental work (including teeth cleanings).  ? Please discuss with your surgeon prophylactic antibiotics as the length of time this intervention will be necessary for you varies with each patient’s health history and situation.  ? Avoid sick people. If you must be around someone who is ill, they should wear a mask.  ? Avoid visits to the Emergency Room or Urgent Care unless you are having a life threatening event.   ? If ordered stockings are to be placed on in the morning and removed at night. Monitor the stockings to ensure that any swelling is not causing the stockings to become too tight. In this case, remove stockings immediately.    IV. INCISION CARE:  ? Wash your hands prior to dressing changes  ? Change the dressing as needed to keep incision clean and dry. Utilize dry gauze and paper tape. Avoid touching the side of the gauze that goes against the incision with your hands.  ? No creams or ointments to the incision  ? May remove dressing once the incision is free of drainage  ? Do not touch or pick at the incision  ? Check incision every day and notify surgeon immediately if any of the following signs or symptoms are noted:  o Increase in redness  o Increase in swelling around the incision and of the entire extremity  o Increase in pain  o Drainage oozing from the incision  o Pulling apart of the edges of the incision  o Increase in overall body temperature (greater than 100.5 degrees)  ? Your surgeon will instruct you regarding suture or staple  removal    V. Medications:   1. Anticoagulants: You will be discharged on an anticoagulant. This is a prophylactic medication that helps prevent blood clots during your post-operative period. The type and length of dosage varies based on your individual needs, procedure performed, and surgeon’s preference.  ? While taking the anticoagulant, you should avoid taking any additional aspirin, ibuprofen (Advil or Motrin), Aleve (Naprosyn) or other non-steroidal anti-inflammatory medications.   ? Notify surgeon immediately if any marie bleeding is noted in the urine, stool, emesis, or from the nose or the incision. Blood in the stool will often appear as black rather than red. Blood in urine may appear as pink. Blood in emesis may appear as brown/black like coffee grounds.  ? You will need to apply pressure for longer periods of time to any cuts or abrasions to stop bleeding  ? Avoid alcohol while taking anticoagulants    2. Stool Softeners: You will be at greater risk of constipation after surgery due to being less mobile and the pain medications.   ? Take stool softeners as instructed by your surgeon while on pain medications. Over the counter Colace 100 mg 1-2 capsules twice daily.   ? If stools become too loose or too frequent, please decreases the dosage or stop the stool softener.  ? If constipation occurs despite use of stool softeners, you are to continue the stool softeners and add a laxative (Milk of Magnesia 1 ounce daily as needed)  ? Drink plenty of fluids, and eat fruits and vegetables during your recovery time    3. Pain Medications utilized after surgery are narcotics and the law requires that the following information be given to all patients that are prescribed narcotics:  ? CLASSIFICATION: Pain medications are called Opioids and are narcotics  ? LEGALITIES: It is illegal to share narcotics with others and to drive within 24 hours of taking narcotics  ? POTENTIAL SIDE EFFECTS: Potential side effects of  opioids include: nausea, vomiting, itching, dizziness, drowsiness, dry mouth, constipation, and difficulty urinating.  ? POTENTIAL ADVERSE EFFECTS:   o Opioid tolerance can develop with use of pain medications and this simply means that it requires more and more of the medication to control pain; however, this is seen more in patients that use opioids for longer periods of time.  o Opioid dependence can develop with use of Opioids and this simply means that to stop the medication can cause withdrawal symptoms; however, this is seen with patients that use Opioids for longer periods of time.  o Opioid addiction can develop with use of Opioids and the incidence of this is very unlikely in patients who take the medications as ordered and stop the medications as instructed.  o Opioid overdose can be dangerous, but is unlikely when the medication is taken as ordered and stopped when ordered. It is important not to mix opioids with alcohol or with and type of sedative such as Benadryl as this can lead to over sedation and respiratory difficulty.  ? DOSAGE:   o Pain medications will need to be taken consistently for the first week to decrease pain and promote adequate pain relief and participation in physical therapy.  o After the initial surgical pain begins to resolve, you may begin to decrease the pain medication. By the end of 6-8 weeks, you should be off of pain medications.  o Refills will not be given by the office during evening hours, on weekends, or after 6-8 weeks post-op.  o To seek refills on pain medications during the initial 6 week post-operative period, you must call the office 48 hours in advance to request the refill. The office will then notify you when to  the prescription. DO NOT wait until you are out of the medication to request a refill.    V. FOLLOW-UP VISITS:  ? You will need to follow up in the office with your surgeon in 3 weeks. Please call this number [phone] to schedule this  appointment.  If you have any concerns or suspected complications prior to your follow up visit, please call your surgeons office. Do not wait until your appointment time if you suspect complications. These will need to be addressed in the office promptly.    Félix Garcia PA-C  [facility]  [address] [place] [address]  [phone]    12/29/2022    CC:Alissa Stafford MD:Félix William MD

## 2022-12-29 NOTE — ANESTHESIA PROCEDURE NOTES
Airway  Urgency: elective    Date/Time: 12/29/2022 7:25 AM  Airway not difficult    General Information and Staff    Patient location during procedure: OR  Anesthesiologist: Nick Dhillon MD  CRNA/CAA: Elena Marrero CRNA    Indications and Patient Condition  Indications for airway management: airway protection    Preoxygenated: yes  MILS not maintained throughout  Mask difficulty assessment: 1 - vent by mask    Final Airway Details  Final airway type: endotracheal airway      Successful airway: ETT  Cuffed: yes   Successful intubation technique: direct laryngoscopy  Facilitating devices/methods: intubating stylet  Endotracheal tube insertion site: oral  Blade: Martell  Blade size: 3  ETT size (mm): 7.0  Cormack-Lehane Classification: grade I - full view of glottis  Placement verified by: chest auscultation   Cuff volume (mL): 8  Measured from: lips  Number of attempts at approach: 1  Assessment: lips, teeth, and gum same as pre-op and atraumatic intubation    Additional Comments  PreO2 100% face mask, IV induction, easy mask, DVL x2, cords  anterior noted,  ecshman stylet used without difficulty, tube through, cuff up, EBBSH, +etCO2, = chest movement, tube secured in place, atraumatic, teeth and lips intact as preop.

## 2022-12-29 NOTE — DISCHARGE PLACEMENT REQUEST
Aydee May (89 y.o. Female)     Date of Birth   11/17/1932    Social Security Number       Address   [address] [place] [address]    Home Phone   [phone]    MRN   [id number]       Yazdanism   Jew    Marital Status                               Admission Date   12/29/22    Admission Type   Elective    Admitting Provider   Félix William MD    Attending Provider   Félix William MD    Department, Room/Bed   [address], P785/1       Discharge Date       Discharge Disposition   Home or Self Care    Discharge Destination                               Attending Provider: Félix William MD    Allergies: No Known Allergies    Isolation: None   Infection: None   Code Status: CPR    Ht: 160 cm (63\")   Wt: 54 kg (119 lb)    Admission Cmt: None   Principal Problem: Primary osteoarthritis of left knee [M17.12]                 Active Insurance as of 12/29/2022     Primary Coverage     Payor Plan Insurance Group Employer/Plan Group    [employer] [id number]     Payor Plan Address Payor Plan Phone Number Payor Plan Fax Number Effective Dates    [address] [phone]  1/1/2014 - None Entered    [facility] [id number]       Subscriber Name Subscriber Birth Date Member ID       REVA MAY 8/26/1931 [id number]                 Emergency Contacts      (Rel.) Home Phone Work Phone Mobile Phone    mason may (Spouse) [phone] -- --    CIARANHEIKE (Daughter) -- -- [phone]

## 2022-12-29 NOTE — THERAPY EVALUATION
Patient Name: Aydee May  : 1932    MRN: [id number]                              Today's Date: 2022       Admit Date: 2022    Visit Dx:     ICD-10-CM ICD-9-CM   1. Primary osteoarthritis of left knee  M17.12 715.16     Patient Active Problem List   Diagnosis   • Primary osteoarthritis of left knee     Past Medical History:   Diagnosis Date   • Arthritis    • History of meningitis    • [place] (hard of hearing)    • Left knee pain    • Osteopenia      Past Surgical History:   Procedure Laterality Date   • FACELIFT     • OVARIAN CYST REMOVAL     • TUBAL ABDOMINAL LIGATION        General Information     Row Name 22 1531          Physical Therapy Time and Intention    Document Type evaluation  -MS     Mode of Treatment physical therapy  -MS     Row Name 22 1531          General Information    Patient Profile Reviewed yes  -MS     Prior Level of Function independent:;all household mobility  -MS     Existing Precautions/Restrictions fall  -MS     Barriers to Rehab none identified  -MS     Row Name 22 1531          Living Environment    People in Home spouse  -MS     Row Name 22 1531          Stairs Within Home, Primary    Stairs, Within Home, Primary bathroom and 'dressing room' are on second floor  -MS     Row Name 22 1531          Cognition    Orientation Status (Cognition) oriented x 4  -MS     Row Name 22 1531          Safety Issues, Functional Mobility    Safety Issues Affecting Function (Mobility) judgment;positioning of assistive device;sequencing abilities  -MS     Impairments Affecting Function (Mobility) strength;balance;endurance/activity tolerance;range of motion (ROM)  -MS     Comment, Safety Issues/Impairments (Mobility) Gait belt and non skid socks donned.  -MS           User Key  (r) = Recorded By, (t) = Taken By, (c) = Cosigned By    Initials Name Provider Type    Cleopatra Vizcarra PT Physical Therapist               Mobility     Row Name  12/29/22 1532          Bed Mobility    Bed Mobility supine-sit  -MS     Supine-Sit [place] (Bed Mobility) standby assist  -MS     Comment, (Bed Mobility) SV for sitting balance.  -MS     Row Name 12/29/22 1532          Transfers    Comment, (Transfers) Sequencing and hand placement cues.  -MS     Row Name 12/29/22 1532          Sit-Stand Transfer    Sit-Stand [place] (Transfers) contact guard;verbal cues;nonverbal cues (demo/gesture)  -MS     Assistive Device (Sit-Stand Transfers) walker, front-wheeled  -MS     Row Name 12/29/22 1532          Gait/Stairs (Locomotion)    [place] Level (Gait) contact guard;verbal cues;nonverbal cues (demo/gesture)  -MS     Assistive Device (Gait) walker, front-wheeled  -MS     Ambulated day of surgery or within 4 hours of PACU discharge yes  -MS     Distance in Feet (Gait) 30'  -MS     Deviations/Abnormal Patterns (Gait) santhosh decreased;gait speed decreased;antalgic  -MS     Comment, (Gait/Stairs) Walker and sequencing cues.  -MS           User Key  (r) = Recorded By, (t) = Taken By, (c) = Cosigned By    Initials Name Provider Type    MS Cleopatra Moreira, PT Physical Therapist               Obj/Interventions     Row Name 12/29/22 1533          Range of Motion Comprehensive    Comment, General Range of Motion L LE limited 2/2 soreness and ace wrap  -MS     Row Name 12/29/22 1533          Strength Comprehensive (MMT)    Comment, General Manual Muscle Testing (MMT) Assessment L LE post op weakness  -MS     Row Name 12/29/22 1533          Motor Skills    Therapeutic Exercise --  L TKA protocol x 10 reps  -MS     Row Name 12/29/22 1533          Balance    Balance Assessment sitting static balance;standing static balance  -MS     Static Sitting Balance independent  -MS     Position, Sitting Balance sitting edge of bed  -MS     Static Standing Balance contact guard  -MS     Position/Device Used, Standing Balance supported;walker, rolling  -MS     Row Name 12/29/22 1533           Sensory Assessment (Somatosensory)    Sensory Assessment (Somatosensory) other (see comments)  mild numbness reported in L LE  -MS           User Key  (r) = Recorded By, (t) = Taken By, (c) = Cosigned By    Initials Name Provider Type    Cleopatra Vizcarra, PT Physical Therapist               Goals/Plan     Row Name 12/29/22 1534          Bed Mobility Goal 1 (PT)    Activity/Assistive Device (Bed Mobility Goal 1, PT) bed mobility activities, all  -MS     [place] Level/Cues Needed (Bed Mobility Goal 1, PT) independent  -MS     Time Frame (Bed Mobility Goal 1, PT) 1 week  -MS     Row Name 12/29/22 1535          Transfer Goal 1 (PT)    Activity/Assistive Device (Transfer Goal 1, PT) transfers, all  -MS     [place] Level/Cues Needed (Transfer Goal 1, PT) supervision required  -MS     Time Frame (Transfer Goal 1, PT) 1 week  -MS     Row Name 12/29/22 1537          Gait Training Goal 1 (PT)    Activity/Assistive Device (Gait Training Goal 1, PT) gait (walking locomotion);walker, rolling  -MS     [place] Level (Gait Training Goal 1, PT) supervision required  -MS     Distance (Gait Training Goal 1, PT) 150'  -MS     Time Frame (Gait Training Goal 1, PT) 1 week  -MS     Row Name 12/29/22 1532          Therapy Assessment/Plan (PT)    Planned Therapy Interventions (PT) balance training;bed mobility training;gait training;home exercise program;patient/family education;postural re-education;ROM (range of motion);stair training;strengthening;transfer training  -MS           User Key  (r) = Recorded By, (t) = Taken By, (c) = Cosigned By    Initials Name Provider Type    Cleopatra Vizcarra, PT Physical Therapist               Clinical Impression     Row Name 12/29/22 5729          Pain    Pretreatment Pain Rating 3/10  -MS     Posttreatment Pain Rating 3/10  -MS     Pain Location - Side/Orientation Left  -MS     Pain Location lower  -MS     Pain Location - knee  -MS     Pain Intervention(s)  Repositioned;Ambulation/increased activity;Rest  -MS     Row Name 12/29/22 1533          Therapy Assessment/Plan (PT)    Rehab Potential (PT) good, to achieve stated therapy goals  -MS     Criteria for Skilled Interventions Met (PT) yes;meets criteria  -MS     Therapy Frequency (PT) daily  -MS     Row Name 12/29/22 1533          Vital Signs    O2 Delivery Pre Treatment room air  -MS     Row Name 12/29/22 1533          Positioning and Restraints    Pre-Treatment Position in bed  -MS     Post Treatment Position chair  -MS     In Bed notified nsg  -MS     In Chair reclined;call light within reach;encouraged to call for assist;exit alarm on  -MS           User Key  (r) = Recorded By, (t) = Taken By, (c) = Cosigned By    Initials Name Provider Type    Cleopatra Vizcarra PT Physical Therapist               Outcome Measures     Row Name 12/29/22 1536 12/29/22 1300       How much help from another person do you currently need...    Turning from your back to your side while in flat bed without using bedrails? 3  -MS 3  -EK    Moving from lying on back to sitting on the side of a flat bed without bedrails? 3  -MS 3  -EK    Moving to and from a bed to a chair (including a wheelchair)? 3  -MS 3  -EK    Standing up from a chair using your arms (e.g., wheelchair, bedside chair)? 3  -MS 3  -EK    Climbing 3-5 steps with a railing? 2  -MS 2  -EK    To walk in hospital room? 3  -MS 3  -EK    AM-PAC 6 Clicks Score (PT) 17  -MS 17  -EK    Highest level of mobility 5 --> Static standing  -MS 5 --> Static standing  -EK    Row Name 12/29/22 1536          Functional Assessment    Outcome Measure Options AM-PAC 6 Clicks Basic Mobility (PT)  -MS           User Key  (r) = Recorded By, (t) = Taken By, (c) = Cosigned By    Initials Name Provider Type    Cleopatra Vizcarra PT Physical Therapist    Judy Shepherd, RN Registered Nurse                             Physical Therapy Education     Title: PT OT SLP Therapies (Done)     Topic:  Physical Therapy (Done)     Point: Mobility training (Done)     Learning Progress Summary           Patient Acceptance, E,TB, VU,NR by MS at 12/29/2022 1536                   Point: Home exercise program (Done)     Learning Progress Summary           Patient Acceptance, E,TB, VU,NR by MS at 12/29/2022 1536                   Point: Body mechanics (Done)     Learning Progress Summary           Patient Acceptance, E,TB, VU,NR by MS at 12/29/2022 1536                   Point: Precautions (Done)     Learning Progress Summary           Patient Acceptance, E,TB, VU,NR by MS at 12/29/2022 1536                               User Key     Initials Effective Dates Name Provider Type Discipline    MS 06/16/21 -  Cleopatra Moreira, PT Physical Therapist PT              PT Recommendation and Plan  Planned Therapy Interventions (PT): balance training, bed mobility training, gait training, home exercise program, patient/family education, postural re-education, ROM (range of motion), stair training, strengthening, transfer training        Time Calculation:    PT Charges     Row Name 12/29/22 1529             Time Calculation    Start Time 1417  -MS      Stop Time 1433  -MS      Time Calculation (min) 16 min  -MS      PT Received On 12/29/22  -MS      PT - Next Appointment 12/30/22  -MS      PT Goal Re-Cert Due Date 01/05/23  -MS         Time Calculation- PT    Total Timed Code Minutes- PT 12 minute(s)  -MS            User Key  (r) = Recorded By, (t) = Taken By, (c) = Cosigned By    Initials Name Provider Type    MS MoreiraCleopatra, PT Physical Therapist              Therapy Charges for Today     Code Description Service Date Service Provider Modifiers Qty    96076456186 HC PT EVAL MOD COMPLEXITY 3 12/29/2022 Cleopatra Moreira, PT GP 1    78874833187 HC PT THER PROC EA 15 MIN 12/29/2022 Cleopatra Moreira, PT GP 1          PT G-Codes  Outcome Measure Options: AM-PAC 6 Clicks Basic Mobility (PT)  AM-PAC 6 Clicks Score (PT): 17  PT  Discharge Summary  Anticipated Discharge Disposition (PT): home with assist, home with home health    Cleopatra Moreira, PT  12/29/2022

## 2022-12-29 NOTE — ANESTHESIA POSTPROCEDURE EVALUATION
Patient: Aydee May    Procedure Summary     Date: 12/29/22 Room / Location: [facility] OR [address] [place] [address] [facility]    Anesthesia Start: 0714 Anesthesia Stop: 0916    Procedure: LEFT TOTAL KNEE ARTHROPLASTY (Left: Knee) Diagnosis:     Surgeons: Félix William MD Provider: Nick Dhillon MD    Anesthesia Type: general ASA Status: 2          Anesthesia Type: general    Vitals  Vitals Value Taken Time   /70 12/29/22 1200   Temp 36.6 °C (97.8 °F) 12/29/22 1200   Pulse 91 12/29/22 1214   Resp 18 12/29/22 1200   SpO2 95 % 12/29/22 1214   Vitals shown include unvalidated device data.        Post Anesthesia Care and Evaluation    Patient location during evaluation: bedside  Patient participation: complete - patient participated  Level of consciousness: awake and alert  Pain management: adequate    Airway patency: patent  Anesthetic complications: No anesthetic complications  PONV Status: controlled  Cardiovascular status: acceptable  Respiratory status: acceptable  Hydration status: acceptable

## 2022-12-29 NOTE — OP NOTE
Orthopaedic Surgery Operative Note    Patient Name:  Aydee May  YOB: 1932  Age: 89 y.o.  Medical Records Number:  [id number]    Date of Procedure:  12/29/2022    Pre-operative Diagnosis:  Primary Osteoarthritis Left Knee    Post-operative Diagnosis:  Primary Osteoarthritis Left Knee    Procedure Performed:  Left Total Knee Arthroplasty    Implants:  Biomet Vanguard TKA, 60 Femoral Component, 67 Tibial Tray with a 40 mm Modular Stem, 31 3-Peg Patella, 10 mm Posterior Lipped Polyethylene Insert    Surgeon:  Félix William M.D.    Assistant: Tanya Garcia (who was present during the critical portions of the case, thereby decreasing operative time and patient morbidity)    Anesthetic Type:  General    Estimated Blood Loss:  250cc's    Specimens: * No orders in the log *    No Complications      Indications for Procedure:  Aydee May is a 89 y.o. female suffering from end stage degenerative changes in the left knee.  The patients pain is becoming disabling, despite extensive conservative care, including NSAIDS, therapy and injections. The risks, benefits and alternatives were discussed and the patient wishes to proceed with left total knee arthroplasty.      Procedure Performed:    After informed consent was obtained, the correct patient identified, the correct operative side marked by the operative surgeon, and pre-operative IV Kefzol  given (prior to tourniquet inflation), the patient was taken to the operating room and placed supine on the operating table.  After general anesthesia was induced, a surgical time out was performed and 1 gm of Tranxemic Acid given, a well-padded tourniquet was placed and the patient's left lower extremity was prepped with chloraprep and draped in a sterile fashion.    A midline incision was made overlying the left knee and we sharply dissected down to expose the parapatellar retinaculum.  A muscle sparing, mid-vastus, parapatellar arthrotomy was performed and  we elevated the soft tissue both medially and laterally.  The menisci and ACL were excised.  We everted the patella and measured this to be 21 mm and we removed 6 mm of bone down to 15 mm.  We measured the patella to be 31 and drilled our three drill holes.  We protected the patella with the patella protector and turned our attention to the femur and the tibia.    We drilled drill holes into the femoral and the tibial intramedullary canals and proceeded to irrigate the canals to remove the fatty marrow.  We used the intramedullary saji and the 5 degree valgus cut block to make our distal femoral cut.  Once we had a smooth surface, we measured the femur to be 60.  We assured we had rotational alignment using the epicondylar axis, then we used the four-in-one cut block to make our anterior, posterior, anterior and posterior chamfer cuts.  We placed our femoral trial, had excellent fit, extended the knee and marked Maxwell's line on the tibia.      We then turned our attention to the tibia, where we irrigated the canal again.  We used the intramedullary saji and the 3 degree posterior sloped cut block to remove 2 mm of bone from the effected side of the tibia.  Prior to making our cut, we assured we had rotational alignment using the external guide and Maxwell's line.  Once we had a smooth surface, we measured the tibia to be 67.  We then removed soft tissue and bony debris from the posterior aspect of the knee.    We placed our trial implants and had excellent fit, range of motion, stability and ligament balance, throughout a complete arc of motion with a 10 lipped polyethylene liner.  We removed our trial implants, punched the tibial keel, copiously irrigated the knee, then cemented our implants in place using Biomet cement.  Once the cement cured, we trialed again with the 10 and 12 AS, standard and posterior lipped polyethylene liners.  The 10 lipped gave us the best range of motion, stability and ligament  balance, throughout a complete arc of motion.  We removed the trials, copiously irrigated the knee with dilute betadine solution, gave IV antibiotics and local anesthetic, then placed our permanent polyethylene liner.  We placed a 1/8\" hemovac drain, then closed the arthrotomy with #1 Vicryl pop-off sutures.  The subcutaneous tissue was closed with 2-0 vicryl pop-off sutures.  The skin was closed with staples.  We placed a sterile dressing of Xeroform, 4x4's, abdominal pads, cast padding and an Ace Wrap.  All sponge and needle counts were correct.  The patient was then awakened from general anesthesia and taken to the recovery room in stable condition.    The patient will be started on Aspirin 325 mg twice daily for DVT prophylaxis.  IV antibiotics will be discontinued within 24 hours of surgery.  Immediately prior to surgery, there were no acute Thromboembolic nor Cardiovascular risk factors.  An updated Medical Reconciliation form is on the chart.    Félix Garcia PA-C  [facility]  [address] [place], [place] [id number] [phone]    12/29/2022

## 2022-12-29 NOTE — PLAN OF CARE
Goal Outcome Evaluation:  Plan of Care Reviewed With: patient   Vss, nvi, dressing c/d/I, HV in place, voiding per brp, ambulating assist x1, minimal c/o pain, plans to d/c home tomorrow with family, educated on pain management.     Progress: improving

## 2022-12-29 NOTE — PLAN OF CARE
Goal Outcome Evaluation:    Patient is a pleasant 89 y.o. female POD0 L TKA with expected post op weakness and impaired functional mobility. Patient is independent at baseline and lives at home with spouse- access to RW. Today, patient performed bed mobility with SBA, required CGA for transfers, and ambulated 30' CGA with a RW. Patient will benefit from skilled PT services acutely to address functional deficits as well as improve level of independence prior to discharge. Anticipate home with HH PT and assist upon DC.

## 2022-12-29 NOTE — H&P
Orthopaedic Surgery History and Physical    Patient Name:  Aydee May  YOB: 1932   Age: 89 y.o.  Medical Records Number:  [id number]    Date of Admission:  12/29/2022  5:13 AM    Chief Complaint:  LEFT TOTAL KNEE ARTHROPLASTY    Aydee May is a 89 y.o. female who presents c/o severe left knee pain.  The pain has been on and off for many years, worsening to the point where the pain is becoming disabling.  The pain is a constant dull ache with occasional sharp, stabbing pain.  The patient has failed conservative treatment and would like to proceed with left total knee arthroplasty.    /73 (BP Location: Left arm, Patient Position: Sitting)   Pulse 75   Temp 97.6 °F (36.4 °C) (Oral)   Resp 18   SpO2 96%     Past Medical History:    Past Medical History:   Diagnosis Date   • Arthritis    • History of meningitis    • [place] (hard of hearing)    • Left knee pain    • Osteopenia        Past Surgical History:   Past Surgical History:   Procedure Laterality Date   • FACELIFT     • OVARIAN CYST REMOVAL     • TUBAL ABDOMINAL LIGATION         Social History:    Social History     Socioeconomic History   • Marital status:    Tobacco Use   • Smoking status: Never   • Smokeless tobacco: Never   Vaping Use   • Vaping Use: Never used   Substance and Sexual Activity   • Alcohol use: Yes     Alcohol/week: 1.0 standard drink     Types: 1 Glasses of wine per week     Comment: EVERY NIGHT   • Drug use: Never   • Sexual activity: Defer       Family History:    Family History   Problem Relation Age of Onset   • [place] Hyperthermia Neg Hx        Current Medications:  Scheduled Meds:ceFAZolin, 2 g, Intravenous, Once  Orthopedic surgery custom cocktail, , Injection, Once  sodium chloride, 3 mL, Intravenous, Q12H      Continuous Infusions:lactated ringers, 9 mL/hr, Last Rate: 9 mL/hr (12/29/22 0605)      PRN Meds:.•  sodium chloride    Current Facility-Administered Medications:   •  ceFAZolin in dextrose  (ANCEF) IVPB solution 2 g, 2 g, Intravenous, Once, Félix William MD  •  lactated ringers infusion, 9 mL/hr, Intravenous, Continuous, Apurva Davidson MD, Last Rate: 9 mL/hr at 12/29/22 0605, 9 mL/hr at 12/29/22 0605  •  ropivacaine 0.5 % 50 mL, Morphine 5 mg, ketorolac 30 mg, EPINEPHrine 1 mg/mL 0.3 mg in sodium chloride 0.9 % 50 mL solution, , Injection, Once, Félix William MD  •  sodium chloride 0.9 % flush 3 mL, 3 mL, Intravenous, Q12H, Apurva Davidson MD  •  sodium chloride 0.9 % flush 3-10 mL, 3-10 mL, Intravenous, PRN, Apurva Davidson MD    Allergies:  No Known Allergies    Review of Systems:   HEENT: Patient denies any headaches, vision changes, change in hearing, or tinnitus, Patient denies any rhinorrhea,epistaxis, sinus pain, mouth or dental problems, sore throat or hoarseness, or dysphagia  Pulmonary: Patient denies any cough, congestion, SOA, or wheezing  Cardiovascular: Patient denies any chest pain, dyspnea, palpitations, weakness, intolerance of exercise, varicosities, swelling of extremities, known murmur  Gastrointestinal:  Patient denies nausea, vomiting, diarrhea, constipation, loss  of appetite, change in appetite, dysphagia, gas, heartburn, melena, change in bowel habits, use of laxatives or other drugs to alter the function of the gastrointestinal tract.  Genital/Urinary: Patient denies dysuria, change in color of urine, change in frequency of urination, pain with urgency, incontinence, retention, or nocturia.  Musculoskeletal: Patient denies increased warmth; redness; or swelling of joints; limitation of function; deformity; crepitation: pain in a joint or an extremity, the neck, or the back, especially with movement.  Neurological: Patient denies dizziness, tremor, ataxia, difficulty in speaking, change in speech, paresthesia, loss of sensation, seizures, syncope, changes in memory.  Endocrine system: Patient denies tremors, palpitations, intolerance of heat or cold,  polyuria, polydipsia, polyphagia, diaphoresis, exophthalmos, or goiter.  Psychological: Patient denies thoughts/plans or harming self or other; depression,  insomnia, night terrors, karel, memory loss, disorientation.  Skin: Patient denies any bruising, rashes, discoloration, pruritus, wounds, ulcers, decubiti, changes in the hair or nails  Hematopoietic: Patient denies history of spontaneous or excessive bleeding, epistaxis, hematuria, melena, fatigue, enlarged or tender lymph nodes, pallor, history of anemia.        Physical Exam:   Awake, A&O x3, affect normal, no acute distress  Ambulating with a limp due to knee pain  Knee ROM is limited due to pain (5-115)  Strength is 4/5 in the quad, hamstring and calf  Cap refill is normal, Sensation intact    Card:  RR, HD Stable  Pulm:  Regular breathing, no S.O.A  Abd:  Soft, NT, ND    Lab Results (last 24 hours)     Procedure Component Value Units Date/Time    SCANNED - LABS [[id number]] Resulted: 12/29/22     Updated: 12/28/22 0948          XR Knee 1 or 2 View Left    Result Date: 12/18/2022  Narrative: XR KNEE 1 OR 2 VW LEFT-  INDICATIONS: Preoperative evaluation  TECHNIQUE: 2 views of the left knee  COMPARISON: None available  FINDINGS:  No acute fracture, erosion, or dislocation is identified. Appearance of elevated patella is likely result of straight-leg positioning, correlate clinically to exclude any possibility of patellar tendon pathology. Degenerative changes are conspicuous, with prominent lateral tibiofemoral joint space narrowing, moderate degenerative spurring. Trace knee effusion is seen. Chondral and arterial calcifications are present.      Impression:  As described.  This report was finalized on 12/18/2022 6:46 AM by Dr. Isak Donato M.D.      XR Chest PA & Lateral    Result Date: 12/18/2022  Narrative: XR CHEST PA AND LATERAL-  HISTORY: Female who is 89 years-old,  preoperative evaluation  TECHNIQUE: Frontal and lateral views of the chest   COMPARISON: None available  FINDINGS: The heart size is borderline. Aorta is tortuous, calcified. Pulmonary vasculature is unremarkable. Minimal likely atelectasis or scarring in the lower lungs. No focal pulmonary consolidation, pleural effusion, or pneumothorax. No acute osseous process.      Impression: No focal pulmonary consolidation. Tortuous aorta. Follow-up as clinically indicated.  This report was finalized on 12/18/2022 6:04 AM by Dr. Isak Donato M.D.          Assessment:  End-stage Primary Left Knee Osteoarthritis    Plan:  Patient's pain is becoming disabling, despite extensive conservative treatment.  Radiographs reveal end-stage degenerative changes.  The risks of surgery, including, but not limited to, heart attack, stroke, dying, DVT, nerve injury, vascular injury, arthrofibrosis and infection were discussed.  The alternatives and benefits were also discussed.  All questions answered and the patient wishes to proceed with left total knee arthroplasty.    Félix Garcia PA-C  [facility]  [address] [place] [address]  [phone]    12/29/2022    CC: Alissa Stafford MD, Félix William MD

## 2022-12-29 NOTE — ANESTHESIA PREPROCEDURE EVALUATION
Anesthesia Evaluation     Patient summary reviewed and Nursing notes reviewed   no history of anesthetic complications:  NPO Solid Status: > 8 hours  NPO Liquid Status: > 2 hours           Airway   Mallampati: II  TM distance: >3 FB  Neck ROM: full  No difficulty expected and Small opening  Dental    (+) implants    Pulmonary - normal exam   Cardiovascular - normal exam  Exercise tolerance: good (4-7 METS)    ECG reviewed    (+) hypertension (no tx), dysrhythmias (RBBB, LAFB),       Neuro/Psych  GI/Hepatic/Renal/Endo      Musculoskeletal     Abdominal    Substance History      OB/GYN          Other   arthritis,      ROS/Med Hx Other: Hard of hearing    Plays tennis                    Anesthesia Plan    ASA 2     general     (With ACB for [facility] PSR    I have reviewed the patient's history and chart with the patient, including all pertinent laboratory results and imaging. I have explained the risks of anesthesia including but not limited to dental damage, corneal abrasion, nerve injury, MI, stroke, aspiration, and death. Patient has agreed to proceed.     Risks of peripheral nerve block were discussed with patient including but not limited to: inadequate block, bleeding, infection, persistent numbness or weakness, nerve damage, painful dysasthesia and need to protect limb while numb.    /73 (BP Location: Left arm, Patient Position: Sitting)   Pulse 75   Temp 36.4 °C (97.6 °F) (Oral)   Resp 18   SpO2 96%   )  intravenous induction     Anesthetic plan, risks, benefits, and alternatives have been provided, discussed and informed consent has been obtained with: patient.        CODE STATUS:

## 2022-12-30 VITALS
WEIGHT: 119.05 LBS | TEMPERATURE: 98.5 F | RESPIRATION RATE: 16 BRPM | BODY MASS INDEX: 21.09 KG/M2 | HEART RATE: 73 BPM | OXYGEN SATURATION: 93 % | HEIGHT: 63 IN | DIASTOLIC BLOOD PRESSURE: 56 MMHG | SYSTOLIC BLOOD PRESSURE: 101 MMHG

## 2022-12-30 LAB
ANION GAP SERPL CALCULATED.3IONS-SCNC: 8.1 MMOL/L (ref 5–15)
BUN SERPL-MCNC: 18 MG/DL (ref 8–23)
BUN/CREAT SERPL: 25 (ref 7–25)
CALCIUM SPEC-SCNC: 8.5 MG/DL (ref 8.2–9.6)
CHLORIDE SERPL-SCNC: 104 MMOL/L (ref 98–107)
CO2 SERPL-SCNC: 25.9 MMOL/L (ref 22–29)
CREAT SERPL-MCNC: 0.72 MG/DL (ref 0.57–1)
DEPRECATED RDW RBC AUTO: 39.4 FL (ref 37–54)
EGFRCR SERPLBLD CKD-EPI 2021: 79.5 ML/MIN/1.73
ERYTHROCYTE [DISTWIDTH] IN BLOOD BY AUTOMATED COUNT: 12.2 % (ref 12.3–15.4)
GLUCOSE SERPL-MCNC: 114 MG/DL (ref 65–99)
HCT VFR BLD AUTO: 31.6 % (ref 34–46.6)
HGB BLD-MCNC: 10 G/DL (ref 12–15.9)
MCH RBC QN AUTO: 28.1 PG (ref 26.6–33)
MCHC RBC AUTO-ENTMCNC: 31.6 G/DL (ref 31.5–35.7)
MCV RBC AUTO: 88.8 FL (ref 79–97)
PLATELET # BLD AUTO: 183 10*3/MM3 (ref 140–450)
PMV BLD AUTO: 10.6 FL (ref 6–12)
POTASSIUM SERPL-SCNC: 4.4 MMOL/L (ref 3.5–5.2)
RBC # BLD AUTO: 3.56 10*6/MM3 (ref 3.77–5.28)
SODIUM SERPL-SCNC: 138 MMOL/L (ref 136–145)
WBC NRBC COR # BLD: 12.68 10*3/MM3 (ref 3.4–10.8)

## 2022-12-30 PROCEDURE — 97110 THERAPEUTIC EXERCISES: CPT

## 2022-12-30 PROCEDURE — 85027 COMPLETE CBC AUTOMATED: CPT | Performed by: ORTHOPAEDIC SURGERY

## 2022-12-30 PROCEDURE — G0378 HOSPITAL OBSERVATION PER HR: HCPCS

## 2022-12-30 PROCEDURE — 80048 BASIC METABOLIC PNL TOTAL CA: CPT | Performed by: ORTHOPAEDIC SURGERY

## 2022-12-30 RX ADMIN — CLINDAMYCIN PHOSPHATE 900 MG: 900 INJECTION, SOLUTION INTRAVENOUS at 01:05

## 2022-12-30 RX ADMIN — FERROUS SULFATE TAB 325 MG (65 MG ELEMENTAL FE) 325 MG: 325 (65 FE) TAB at 09:11

## 2022-12-30 RX ADMIN — ASPIRIN 325 MG: 325 TABLET, COATED ORAL at 09:11

## 2022-12-30 RX ADMIN — ACETAMINOPHEN 325 MG: 325 TABLET, FILM COATED ORAL at 09:10

## 2022-12-30 NOTE — PROGRESS NOTES
Continued Stay Note  [facility]     Patient Name: Aydee May  MRN: [id number]  Today's Date: 12/30/2022    Admit Date: 12/29/2022    Plan: [facility]   Discharge Plan     Row Name 12/30/22 1545       Plan    Plan [facility]    Patient/Family in Agreement with Plan yes    Plan Comments Spoke with pt, verified correct information on facesheet and explained the role of CCP. Pt would like to d/c home with [facility], [facility] to [facility] with [facility] who states they are able to accept. Plan will be to d/c home with [facility] and family support.    Final Discharge Disposition Code 01 - home or self-care    Final Note [facility]               Discharge Codes    No documentation.               Expected Discharge Date and Time     Expected Discharge Date Expected Discharge Time    Dec 30, 2022             Samara Munoz RN

## 2022-12-30 NOTE — THERAPY TREATMENT NOTE
Patient Name: Aydee May  : 1932    MRN: [id number]                              Today's Date: 2022       Admit Date: 2022    Visit Dx:     ICD-10-CM ICD-9-CM   1. Primary osteoarthritis of left knee  M17.12 715.16     Patient Active Problem List   Diagnosis   • Primary osteoarthritis of left knee     Past Medical History:   Diagnosis Date   • Arthritis    • History of meningitis    • [place] (hard of hearing)    • Left knee pain    • Osteopenia      Past Surgical History:   Procedure Laterality Date   • FACELIFT     • OVARIAN CYST REMOVAL     • TOTAL KNEE ARTHROPLASTY Left 2022    Procedure: LEFT TOTAL KNEE ARTHROPLASTY;  Surgeon: Félix Willima MD;  [place] [facility] [place]  Service: Orthopedics;  Laterality: Left;   • TUBAL ABDOMINAL LIGATION        General Information     Row Name 22 1643          Physical Therapy Time and Intention    Document Type therapy note (daily note);discharge treatment  -     Mode of Treatment individual therapy;physical therapy  -     Row Name 22 1643          General Information    Patient Profile Reviewed yes  -     Existing Precautions/Restrictions fall  -     Row Name 22 1643          Living Environment    People in Home spouse  dtr to assist  -     Row Name 22 1643          Home Main Entrance    Number of Stairs, Main Entrance one  -     Stair Railings, Main Entrance railing on right side (ascending)  -     Row Name 22 1643          Stairs Within Home, Primary    Stairs Comment, Within Home, Primary has flight when showering necessary  -     Row Name 22 1643          Cognition    Orientation Status (Cognition) oriented x 4  -     Row Name 22 1643          Safety Issues, Functional Mobility    Safety Issues Affecting Function (Mobility) impulsivity  [place]-cues for sequencing and safety multiple times before she heard directions  -     Impairments Affecting Function (Mobility)  balance;endurance/activity tolerance;strength  -           User Key  (r) = Recorded By, (t) = Taken By, (c) = Cosigned By    Initials Name Provider Type    Rachael España PTA Physical Therapist Assistant               Mobility     Row Name 12/30/22 1645          Bed Mobility    Supine-Sit Stanley (Bed Mobility) standby assist;verbal cues  -     Assistive Device (Bed Mobility) head of bed elevated  -     Row Name 12/30/22 1645          Sit-Stand Transfer    Sit-Stand Stanley (Transfers) contact guard;verbal cues;nonverbal cues (demo/gesture)  -     Assistive Device (Sit-Stand Transfers) walker, front-wheeled  -JM     Row Name 12/30/22 1645          Gait/Stairs (Locomotion)    Stanley Level (Gait) contact guard;verbal cues;nonverbal cues (demo/gesture)  -     Assistive Device (Gait) walker, front-wheeled  -JM     Distance in Feet (Gait) 40ft, rest then 65ft-cues for sequencing  -     Deviations/Abnormal Patterns (Gait) base of support, narrow;santhosh decreased;stride length decreased  -     Bilateral Gait Deviations forward flexed posture  -     Stanley Level (Stairs) contact guard;verbal cues  -     Handrail Location (Stairs) right side (descending)  -     Number of Steps (Stairs) 4  -     Ascending Technique (Stairs) step-to-step  -     Descending Technique (Stairs) step-to-step  -     Stairs, Safety Issues balance decreased during turns  -JM     Stairs, Impairments strength decreased  -           User Key  (r) = Recorded By, (t) = Taken By, (c) = Cosigned By    Initials Name Provider Type    Rachael España PTA Physical Therapist Assistant               Obj/Interventions     Row Name 12/30/22 1647          Motor Skills    Therapeutic Exercise --  TKR protocol x10 reps, indep -cues only  -           User Key  (r) = Recorded By, (t) = Taken By, (c) = Cosigned By    Initials Name Provider Type    Rachael España PTA Physical Therapist Assistant                Goals/Plan    No documentation.                Clinical Impression     Row Name 12/30/22 1647          Pain    Pretreatment Pain Rating 5/10  -     Posttreatment Pain Rating 6/10  -     Pain Location - Side/Orientation Left  -     Pain Location - knee  -     Pain Intervention(s) Medication (See MAR);Repositioned;Elevated  -     Row Name 12/30/22 1649 12/30/22 1647       Plan of Care Review    Plan of Care Reviewed With -- patient  -JM    Progress -- improving  -    Outcome Evaluation Pt edie incr amb dist, edie stair training , edie TKR exer x10 reps-perf indep w/cues; pt slightly impulsive-educ offered on safety; plans home w/hh and fam assist; RN in room and knows pt ready to DC  - --    Row Name 12/30/22 1647          Therapy Assessment/Plan (PT)    Rehab Potential (PT) good, to achieve stated therapy goals  -     Criteria for Skilled Interventions Met (PT) yes  -     Row Name 12/30/22 1647          Positioning and Restraints    Pre-Treatment Position in bed  -     Post Treatment Position chair  -JM     In Chair reclined;call light within reach;encouraged to call for assist;exit alarm on;with family/caregiver;with nsg  -           User Key  (r) = Recorded By, (t) = Taken By, (c) = Cosigned By    Initials Name Provider Type    Rachael España PTA Physical Therapist Assistant               Outcome Measures     Row Name 12/30/22 1648 12/30/22 0910       How much help from another person do you currently need...    Turning from your back to your side while in flat bed without using bedrails? 4  -JM 3  -EK    Moving from lying on back to sitting on the side of a flat bed without bedrails? 4  -JM 3  -EK    Moving to and from a bed to a chair (including a wheelchair)? 3  -JM 3  -EK    Standing up from a chair using your arms (e.g., wheelchair, bedside chair)? 3  -JM 3  -EK    Climbing 3-5 steps with a railing? 3  -JM 3  -EK    To walk in hospital room? 3  -JM 3  -EK    AM-PAC 6 Clicks Score  (PT) 20  -JM 18  -EK    Highest level of mobility 6 --> Walked 10 steps or more  - 6 --> Walked 10 steps or more  -EK          User Key  (r) = Recorded By, (t) = Taken By, (c) = Cosigned By    Initials Name Provider Type    Rachael España PTA Physical Therapist Assistant    Judy Shepherd, RN Registered Nurse                             Physical Therapy Education     Title: PT OT SLP Therapies (Resolved)     Topic: Physical Therapy (Resolved)     Point: Mobility training (Resolved)     Learning Progress Summary           Patient Acceptance, E,TB, VU by EK at 12/29/2022 1541    Acceptance, E,TB, VU,NR by MS at 12/29/2022 1536                   Point: Home exercise program (Resolved)     Learning Progress Summary           Patient Acceptance, E,TB, VU by EK at 12/29/2022 1541    Acceptance, E,TB, VU,NR by MS at 12/29/2022 1536                   Point: Body mechanics (Resolved)     Learning Progress Summary           Patient Acceptance, E,TB, VU by EK at 12/29/2022 1541    Acceptance, E,TB, VU,NR by MS at 12/29/2022 1536                   Point: Precautions (Resolved)     Learning Progress Summary           Patient Acceptance, E,TB, VU by EK at 12/29/2022 1541    Acceptance, E,TB, VU,NR by MS at 12/29/2022 1536                               User Key     Initials Effective Dates Name Provider Type Discipline    MS 06/16/21 -  Cleopatra Moreira, PT Physical Therapist PT    EK 08/15/22 -  Judy Paiz, RN Registered Nurse Nurse              PT Recommendation and Plan     Plan of Care Reviewed With: patient  Progress: improving  Outcome Evaluation: Pt edie incr amb dist, edie stair training , edie TKR exer x10 reps-perf indep w/cues; pt slightly impulsive-educ offered on safety; plans home w/hh and fam assist; RN in room and knows pt ready to DC     Time Calculation:    PT Charges     Row Name 12/30/22 1651             Time Calculation    Start Time 0910  -JM      Stop Time 0955  -JM      Time Calculation (min) 45 min   -FARHAN      PT Received On 12/30/22  -FARHAN            User Key  (r) = Recorded By, (t) = Taken By, (c) = Cosigned By    Initials Name Provider Type    Rachael España PTA Physical Therapist Assistant              Therapy Charges for Today     Code Description Service Date Service Provider Modifiers Qty    40064360035 HC PT THER PROC EA 15 MIN 12/30/2022 Rachael Moya PTA GP 3          PT G-Codes  Outcome Measure Options: AM-PAC 6 Clicks Basic Mobility (PT)  AM-PAC 6 Clicks Score (PT): 20  PT Discharge Summary  Anticipated Discharge Disposition (PT): home with assist, home with home health    Rachael Moya PTA  12/30/2022

## 2022-12-30 NOTE — PROGRESS NOTES
Orthopaedic Surgery  Progress Note  12/30/2022    Patients Name:  Aydee May  YOB: 1932  Age: 89 y.o.  Medical Records Number:  [id number]  Date of Admission: 12/29/2022    No complaints except appropriate pain and stiffness in the left knee    Vitals:  Vitals:    12/29/22 1400 12/29/22 1740 12/29/22 2125 12/30/22 0621   BP: 142/73 101/67 104/58 101/56   BP Location: Left arm Left arm Left arm Left arm   Patient Position: Lying Sitting Lying Lying   Pulse: 102 90 85 73   Resp: 18 18 16 16   Temp: 97.6 °F (36.4 °C) 97.4 °F (36.3 °C) 97.5 °F (36.4 °C) 98.5 °F (36.9 °C)   TempSrc: Oral Oral Oral Oral   SpO2: 91% 90% (!) 89%  Comment: RN NOTIFIED (MARIA ISABEL) 93%   Weight:       Height:           LLE:  NVI, calf nontender, Sensation intact  No signs of DVT    Incision: clean, no signs of infection    Lab Results (last 24 hours)     Procedure Component Value Units Date/Time    Basic Metabolic Panel [[id number]]  (Abnormal) Collected: 12/30/22 0423    Specimen: Blood Updated: 12/30/22 0550     Glucose 114 mg/dL      BUN 18 mg/dL      Creatinine 0.72 mg/dL      Sodium 138 mmol/L      Potassium 4.4 mmol/L      Chloride 104 mmol/L      CO2 25.9 mmol/L      Calcium 8.5 mg/dL      BUN/Creatinine Ratio 25.0     Anion Gap 8.1 mmol/L      eGFR 79.5 mL/min/1.73      Comment: National Kidney Foundation and American Society of Nephrology (ASN) Task Force recommended calculation based on the Chronic Kidney Disease Epidemiology Collaboration (CKD-EPI) equation refit without adjustment for race.       Narrative:      GFR Normal >60  Chronic Kidney Disease <60  Kidney Failure <15    The GFR formula is only valid for adults with stable renal function between ages 18 and 70.    CBC (No Diff) [[id number]]  (Abnormal) Collected: 12/30/22 0423    Specimen: Blood Updated: 12/30/22 0534     WBC 12.68 10*3/mm3      RBC 3.56 10*6/mm3      Hemoglobin 10.0 g/dL      Hematocrit 31.6 %      MCV 88.8 fL      MCH 28.1 pg      MCHC 31.6  g/dL      RDW 12.2 %      RDW-SD 39.4 fl      MPV 10.6 fL      Platelets 183 10*3/mm3           XR Knee 1 or 2 View Left    Result Date: 12/29/2022  Narrative: LEFT KNEE:  AP, LATERAL  HISTORY: Total knee arthroplasty  FINDINGS:  There has been left total knee arthroplasty and patellar resurfacing and the components appear in good position without evidence for complicating feature.  Recent post-surgical changes include soft tissue gas and a surgical drain.  This report was finalized on 12/29/2022 9:49 AM by Dr. Jamie Marte M.D.      XR Knee 1 or 2 View Left    Result Date: 12/18/2022  Narrative: XR KNEE 1 OR 2 VW LEFT-  INDICATIONS: Preoperative evaluation  TECHNIQUE: 2 views of the left knee  COMPARISON: None available  FINDINGS:  No acute fracture, erosion, or dislocation is identified. Appearance of elevated patella is likely result of straight-leg positioning, correlate clinically to exclude any possibility of patellar tendon pathology. Degenerative changes are conspicuous, with prominent lateral tibiofemoral joint space narrowing, moderate degenerative spurring. Trace knee effusion is seen. Chondral and arterial calcifications are present.      Impression:  As described.  This report was finalized on 12/18/2022 6:46 AM by Dr. Isak Donato M.D.      Adductor Canal Block    Result Date: 12/29/2022  Narrative: Apurva Davidson MD     12/29/2022  6:47 AM Adductor Canal Block Patient reassessed immediately prior to procedure Patient location during procedure: pre-op Start time: 12/29/2022 6:42 AM Stop time: 12/29/2022 6:44 AM Reason for block: at surgeon's request and post-op pain management Performed by Anesthesiologist: Apurva Davidson MD Preanesthetic Checklist Completed: patient identified, IV checked, site marked, risks and benefits discussed, surgical consent, monitors and equipment checked, pre-op evaluation and timeout performed Prep: Pt Position: sitting Sterile barriers:cap, gloves  and mask Prep: ChloraPrep Patient monitoring: blood pressure monitoring, continuous pulse oximetry and EKG Procedure Sedation: no Performed under: local infiltration Guidance:ultrasound guided ULTRASOUND INTERPRETATION.  Using ultrasound guidance a 21 G gauge needle was placed in close proximity to the nerve, at which point, under ultrasound guidance anesthetic was injected in the area of the nerve and spread of the anesthesia was seen on ultrasound in close proximity thereto.  There were no abnormalities seen on ultrasound; a digital image was taken; and the patient tolerated the procedure with no complications. Images:still images obtained, printed/placed on chart Laterality:left Block Type:adductor canal block Injection Technique:single-shot Needle Type:short-bevel and echogenic Needle Gauge:21 G Medications Used: dexamethasone (DECADRON) injection - Injection  4 mg - 12/29/2022 6:42:00 AM ropivacaine (NAROPIN) 0.5 % injection - Injection  20 mL - 12/29/2022 6:42:00 AM Medications Comment:Ultrasound Interpretation:  Using ultrasound guidance the needle was placed in close proximity to the target nerve and anesthetic was injected in the area of the target nerve and/or bundles, and spread of the anesthetic was seen on ultrasound in close proximity thereto.  There were no abnormalities seen on ultrasound; a digital image was taken; and the patient tolerated the procedure with no complications.  Block placed for postoperative pain control per surgeon request.  Post Assessment Injection Assessment: negative aspiration for heme, no paresthesia on injection and incremental injection Patient Tolerance:comfortable throughout block Complications:no     XR Chest PA & Lateral    Result Date: 12/18/2022  Narrative: XR CHEST PA AND LATERAL-  HISTORY: Female who is 89 years-old,  preoperative evaluation  TECHNIQUE: Frontal and lateral views of the chest  COMPARISON: None available  FINDINGS: The heart size is borderline. Aorta  is tortuous, calcified. Pulmonary vasculature is unremarkable. Minimal likely atelectasis or scarring in the lower lungs. No focal pulmonary consolidation, pleural effusion, or pneumothorax. No acute osseous process.      Impression: No focal pulmonary consolidation. Tortuous aorta. Follow-up as clinically indicated.  This report was finalized on 12/18/2022 6:04 AM by Dr. Isak Donato M.D.        Assesment/Plan:    Procedures:  Left TKA  Postoperative Day: 1  Weightbearing Status:  WBAT with walker  DVT Prophylaxis:  ASA for DVT prophylaxis    Disposition:  Home with home health after PT today, if comfortable and mobilizing safely    Félix Garcia PA-C  [facility]  [address] [place], [place] [id number] [phone]    12/30/2022

## 2022-12-30 NOTE — PLAN OF CARE
Goal Outcome Evaluation:  Plan of Care Reviewed With: patient        Progress: improving  Outcome Evaluation: Pt edie incr amb dist, edie stair training , edie TKR exer x10 reps-perf indep w/cues; pt slightly impulsive-educ offered on safety; plans home w/hh and fam assist; RN in room and knows pt ready to DC

## 2022-12-30 NOTE — PLAN OF CARE
Goal Outcome Evaluation:  Plan of Care Reviewed With: patient   All goals met, pt safe for d/c     Progress: improving

## 2022-12-30 NOTE — PLAN OF CARE
Problem: Adult Inpatient Plan of Care  Goal: Plan of Care Review  Outcome: Ongoing, Progressing  Goal: Patient-Specific Goal (Individualized)  Outcome: Ongoing, Progressing  Goal: Absence of Hospital-Acquired Illness or Injury  Outcome: Ongoing, Progressing  Intervention: Identify and Manage Fall Risk  Recent Flowsheet Documentation  Taken 12/30/2022 0205 by Jet Bautista RN  Safety Promotion/Fall Prevention:   activity supervised   assistive device/personal items within reach   safety round/check completed  Taken 12/30/2022 0005 by Jet Bautista RN  Safety Promotion/Fall Prevention:   activity supervised   assistive device/personal items within reach   safety round/check completed  Taken 12/29/2022 2205 by Jet Bautista RN  Safety Promotion/Fall Prevention:   activity supervised   assistive device/personal items within reach   safety round/check completed  Taken 12/29/2022 2000 by Jet Bautista RN  Safety Promotion/Fall Prevention:   activity supervised   assistive device/personal items within reach   safety round/check completed  Intervention: Prevent Skin Injury  Recent Flowsheet Documentation  Taken 12/29/2022 2000 by Jet Bautista RN  Skin Protection:   adhesive use limited   incontinence pads utilized  Intervention: Prevent and Manage VTE (Venous Thromboembolism) Risk  Recent Flowsheet Documentation  Taken 12/30/2022 0005 by Jet Bautista RN  VTE Prevention/Management:   bilateral   sequential compression devices on  Taken 12/29/2022 2000 by Jet Bautista RN  VTE Prevention/Management:   bilateral   sequential compression devices on  Range of Motion: active ROM (range of motion) encouraged  Intervention: Prevent Infection  Recent Flowsheet Documentation  Taken 12/30/2022 0205 by Jet Bautista RN  Infection Prevention:   hand hygiene promoted   rest/sleep promoted  Taken 12/30/2022 0005 by Jet Bautista RN  Infection Prevention:   hand hygiene promoted   rest/sleep  promoted  Taken 12/29/2022 2205 by Jet Bautista RN  Infection Prevention:   rest/sleep promoted   hand hygiene promoted  Taken 12/29/2022 2000 by Jet Bautista RN  Infection Prevention: rest/sleep promoted  Goal: Optimal Comfort and Wellbeing  Outcome: Ongoing, Progressing  Intervention: Monitor Pain and Promote Comfort  Recent Flowsheet Documentation  Taken 12/29/2022 2000 by Jet Bautista RN  Pain Management Interventions: (will bring pain meds around when they are availabile) see MAR  Intervention: Provide Person-Centered Care  Recent Flowsheet Documentation  Taken 12/29/2022 2000 by Jet Bautista RN  Trust Relationship/Rapport:   care explained   choices provided   questions answered   questions encouraged  Goal: Readiness for Transition of Care  Outcome: Ongoing, Progressing     Problem: Adjustment to Surgery (Knee Arthroplasty)  Goal: Optimal Coping  Outcome: Ongoing, Progressing  Intervention: Support Psychosocial Response to Surgery and Mobility Changes  Recent Flowsheet Documentation  Taken 12/29/2022 2000 by Jet Bautista RN  Supportive Measures: active listening utilized     Problem: Bleeding (Knee Arthroplasty)  Goal: Absence of Bleeding  Outcome: Ongoing, Progressing     Problem: Bowel Motility Impaired (Knee Arthroplasty)  Goal: Effective Bowel Elimination  Outcome: Ongoing, Progressing  Intervention: Enhance Bowel Motility and Elimination  Recent Flowsheet Documentation  Taken 12/29/2022 2000 by Jet Bautista RN  Bowel Elimination Promotion: adequate fluid intake promoted     Problem: Fluid and Electrolyte Imbalance (Knee Arthroplasty)  Goal: Fluid and Electrolyte Balance  Outcome: Ongoing, Progressing     Problem: Functional Ability Impaired (Knee Arthroplasty)  Goal: Optimal Functional Ability  Outcome: Ongoing, Progressing     Problem: Infection (Knee Arthroplasty)  Goal: Absence of Infection Signs and Symptoms  Outcome: Ongoing, Progressing  Intervention: Prevent or Manage  Infection  Recent Flowsheet Documentation  Taken 12/30/2022 0205 by Jet Bautista RN  Infection Prevention:   hand hygiene promoted   rest/sleep promoted  Taken 12/30/2022 0005 by Jet Bautista RN  Infection Prevention:   hand hygiene promoted   rest/sleep promoted  Taken 12/29/2022 2205 by Jet Bautista RN  Infection Prevention:   rest/sleep promoted   hand hygiene promoted  Taken 12/29/2022 2000 by Jet Bautista RN  Infection Prevention: rest/sleep promoted     Problem: Neurovascular Compromise (Knee Arthroplasty)  Goal: Intact Neurovascular Status  Outcome: Ongoing, Progressing  Intervention: Prevent or Manage Neurovascular Compromise  Recent Flowsheet Documentation  Taken 12/30/2022 0005 by Jet Bautista RN  Compartment Syndrome Management: active flexion/extension encouraged     Problem: Ongoing Anesthesia Effects (Knee Arthroplasty)  Goal: Anesthesia/Sedation Recovery  Outcome: Ongoing, Progressing  Intervention: Optimize Anesthesia Recovery  Recent Flowsheet Documentation  Taken 12/30/2022 0205 by Jet Bautista RN  Safety Promotion/Fall Prevention:   activity supervised   assistive device/personal items within reach   safety round/check completed  Taken 12/30/2022 0005 by Jet Bautista RN  Safety Promotion/Fall Prevention:   activity supervised   assistive device/personal items within reach   safety round/check completed  Taken 12/29/2022 2205 by Jet Bautista RN  Safety Promotion/Fall Prevention:   activity supervised   assistive device/personal items within reach   safety round/check completed  Taken 12/29/2022 2200 by Jet Bautista RN  Administration (IS): proper technique demonstrated  Taken 12/29/2022 2000 by Jet Bautista RN  Patient Tolerance (IS): good  Safety Promotion/Fall Prevention:   activity supervised   assistive device/personal items within reach   safety round/check completed  Administration (IS): proper technique demonstrated  Level Incentive  Spirometer (mL): 2000  Number of Repetitions (IS): 10     Problem: Pain (Knee Arthroplasty)  Goal: Acceptable Pain Control  Outcome: Ongoing, Progressing  Intervention: Prevent or Manage Pain  Recent Flowsheet Documentation  Taken 12/29/2022 2000 by Jet Bautista RN  Pain Management Interventions: (will bring pain meds around when they are availabile) see MAR     Problem: Postoperative Nausea and Vomiting (Knee Arthroplasty)  Goal: Nausea and Vomiting Relief  Outcome: Ongoing, Progressing  Intervention: Prevent or Manage Nausea and Vomiting  Recent Flowsheet Documentation  Taken 12/29/2022 2000 by Jet Bautista RN  Nausea/Vomiting Interventions: stimuli minimized     Problem: Postoperative Urinary Retention (Knee Arthroplasty)  Goal: Effective Urinary Elimination  Outcome: Ongoing, Progressing     Problem: Respiratory Compromise (Knee Arthroplasty)  Goal: Effective Oxygenation and Ventilation  Outcome: Ongoing, Progressing  Intervention: Optimize Oxygenation and Ventilation  Recent Flowsheet Documentation  Taken 12/29/2022 2200 by Jet Bautista, RN  Administration (IS): proper technique demonstrated  Taken 12/29/2022 2000 by Jet Bautista, RN  Patient Tolerance (IS): good  Administration (IS): proper technique demonstrated  Airway/Ventilation Management: airway patency maintained  Level Incentive Spirometer (mL): 2000  Number of Repetitions (IS): 10   Goal Outcome Evaluation:

## 2023-01-05 ENCOUNTER — TELEPHONE (OUTPATIENT)
Dept: ORTHOPEDIC SURGERY | Facility: HOSPITAL | Age: 88
End: 2023-01-05
Payer: COMMERCIAL

## 2023-01-05 NOTE — TELEPHONE ENCOUNTER
Called and spoke with Ms. May at this time. She is 1 week SP LTK. She said she is doing pretty good. She had a rough few days but survived. PT is coming out to see her, she is doing her exercises, and just keeps moving. Pain is controlled. BM's are good. Incision looks good. She is using the walker. Ms. May doesn't have any questions for me at this time. She was given my contact information should she need anything. She voiced understanding and appreciated the call.

## 2024-03-20 ENCOUNTER — TELEPHONE (OUTPATIENT)
Dept: URGENT CARE | Facility: CLINIC | Age: 89
End: 2024-03-20
Payer: COMMERCIAL

## 2024-03-20 DIAGNOSIS — N39.0 ACUTE UTI: Primary | ICD-10-CM

## 2024-03-20 RX ORDER — CEFUROXIME AXETIL 500 MG/1
500 TABLET ORAL 2 TIMES DAILY
Qty: 10 TABLET | Refills: 0 | Status: SHIPPED | OUTPATIENT
Start: 2024-03-20

## 2024-03-20 NOTE — TELEPHONE ENCOUNTER
D/w pt.  GI upset w/ augmentin; o/w no new c/o; responds well to/tolerates ceftin.  P - dc augmentin; ceftin 500 # 10; o/w same.

## (undated) DEVICE — DRSNG GZ PETROLTM XEROFORM CURAD 1X8IN STRL

## (undated) DEVICE — DECANTER BAG 9": Brand: MEDLINE INDUSTRIES, INC.

## (undated) DEVICE — PK KN TOTL 40

## (undated) DEVICE — CONTAINER,SPECIMEN,OR STERILE,4OZ: Brand: MEDLINE

## (undated) DEVICE — STPLR SKIN VISISTAT WD 35CT

## (undated) DEVICE — PENCL ES MEGADINE EZ/CLEAN BUTN W/HOLSTR 10FT

## (undated) DEVICE — SYRINGE, LUER LOCK, 60ML: Brand: MEDLINE

## (undated) DEVICE — DUAL CUT SAGITTAL BLADE

## (undated) DEVICE — ANTIBACTERIAL UNDYED BRAIDED (POLYGLACTIN 910), SYNTHETIC ABSORBABLE SUTURE: Brand: COATED VICRYL

## (undated) DEVICE — UNDERCAST PADDING: Brand: DEROYAL

## (undated) DEVICE — GLV SURG PREMIERPRO ORTHO LTX PF SZ7.5 BRN

## (undated) DEVICE — KT DRN EVAC WND PVC PCH WTROC RND 10F400

## (undated) DEVICE — DRAPE,REIN 53X77,STERILE: Brand: MEDLINE

## (undated) DEVICE — GLV SURG BIOGEL LTX PF 7 1/2

## (undated) DEVICE — BNDG ELAS ELITE V/CLOSE 4IN 5YD LF STRL

## (undated) DEVICE — PAD,ABDOMINAL,8"X10",ST,LF: Brand: MEDLINE

## (undated) DEVICE — NEEDLE, QUINCKE, 20GX3.5": Brand: MEDLINE

## (undated) DEVICE — APPL CHLORAPREP HI/LITE 26ML ORNG